# Patient Record
Sex: FEMALE | Race: BLACK OR AFRICAN AMERICAN | NOT HISPANIC OR LATINO | Employment: FULL TIME | URBAN - METROPOLITAN AREA
[De-identification: names, ages, dates, MRNs, and addresses within clinical notes are randomized per-mention and may not be internally consistent; named-entity substitution may affect disease eponyms.]

---

## 2018-07-06 ENCOUNTER — OFFICE VISIT (OUTPATIENT)
Dept: OBSTETRICS AND GYNECOLOGY | Facility: CLINIC | Age: 43
End: 2018-07-06
Payer: COMMERCIAL

## 2018-07-06 ENCOUNTER — TELEPHONE (OUTPATIENT)
Dept: INTERNAL MEDICINE | Facility: CLINIC | Age: 43
End: 2018-07-06

## 2018-07-06 VITALS
WEIGHT: 169 LBS | HEIGHT: 62 IN | DIASTOLIC BLOOD PRESSURE: 120 MMHG | BODY MASS INDEX: 31.1 KG/M2 | SYSTOLIC BLOOD PRESSURE: 180 MMHG

## 2018-07-06 DIAGNOSIS — I10 ESSENTIAL HYPERTENSION: ICD-10-CM

## 2018-07-06 DIAGNOSIS — Z01.419 WELL WOMAN EXAM WITH ROUTINE GYNECOLOGICAL EXAM: Primary | ICD-10-CM

## 2018-07-06 DIAGNOSIS — N89.8 VAGINAL DISCHARGE: ICD-10-CM

## 2018-07-06 LAB
CANDIDA RRNA VAG QL PROBE: NEGATIVE
G VAGINALIS RRNA GENITAL QL PROBE: POSITIVE
T VAGINALIS RRNA GENITAL QL PROBE: NEGATIVE

## 2018-07-06 PROCEDURE — 99999 PR PBB SHADOW E&M-EST. PATIENT-LVL III: CPT | Mod: PBBFAC,,, | Performed by: OBSTETRICS & GYNECOLOGY

## 2018-07-06 PROCEDURE — 99396 PREV VISIT EST AGE 40-64: CPT | Mod: S$GLB,,, | Performed by: OBSTETRICS & GYNECOLOGY

## 2018-07-06 PROCEDURE — 87660 TRICHOMONAS VAGIN DIR PROBE: CPT

## 2018-07-06 RX ORDER — AMLODIPINE BESYLATE 5 MG/1
5 TABLET ORAL DAILY
Qty: 30 TABLET | Refills: 11 | Status: SHIPPED | OUTPATIENT
Start: 2018-07-06 | End: 2018-08-09 | Stop reason: SDUPTHER

## 2018-07-06 NOTE — TELEPHONE ENCOUNTER
----- Message from Jessica WHALEN Gavin sent at 7/6/2018  4:57 PM CDT -----  Contact: PT Portal Request  Appointment Request From: Debbi Zhu    With Provider: Shamir Villegas MD [-Primary Care Physician-]    Would Accept With:Only the person I've selected    Preferred Date Range: Any date 7/6/2018 or later    Preferred Times: Any    Reason for visit: Request an Appt    Comments:  Saw OB/GYN and Pressure was High 180/120 medicine was prescribed but  a primary care DrTania is needed for annual check up and routine care

## 2018-07-06 NOTE — PROGRESS NOTES
History & Physical  Gynecology      SUBJECTIVE:     Chief Complaint: Well Woman       History of Present Illness:  Annual Exam-Premenopausal  Patient presents for annual exam. The patient has complaints today of vaginal discharge. Menstrual cycles are once a month, 8 days- heavy for the first 3-4 days (overnight pads).  She had a biopsy two years ago which was normal.  Periods have been this way for three years.  The patient is sexually active. GYN screening history: last pap: approximate date  and was normal. The patient participates in regular exercise: no.  The patient does not smoke.      Contraception: BTL    FH:  Breast cancer: none  Colon cancer: none  Ovarian cancer: none    Review of patient's allergies indicates:  No Known Allergies    Past Medical History:   Diagnosis Date    Abnormal Pap smear of cervix     laser ablation    Anemia     Fibroids     Hypertension      Past Surgical History:   Procedure Laterality Date     SECTION      6    DILATION AND CURETTAGE OF UTERUS      LASER ABLATION OF THE CERVIX      Secondary to abnormal pap    TUBAL LIGATION       OB History      Para Term  AB Living    8 6 5 1 2 6    SAB TAB Ectopic Multiple Live Births    2     0 6        Family History   Problem Relation Age of Onset    Hypertension Father     Breast cancer Neg Hx     Colon cancer Neg Hx     Ovarian cancer Neg Hx      Social History   Substance Use Topics    Smoking status: Never Smoker    Smokeless tobacco: Not on file    Alcohol use Yes      Comment: occ.       Current Outpatient Prescriptions   Medication Sig    amLODIPine (NORVASC) 5 MG tablet Take 1 tablet (5 mg total) by mouth once daily.     No current facility-administered medications for this visit.          Review of Systems:  Review of Systems   Constitutional: Negative for activity change, appetite change and fever.   Respiratory: Negative for shortness of breath.    Cardiovascular: Negative for  chest pain.   Gastrointestinal: Negative for abdominal pain, constipation, diarrhea, nausea and vomiting.   Genitourinary: Positive for vaginal discharge and vaginal odor. Negative for menorrhagia, menstrual problem, pelvic pain, vaginal bleeding and vaginal pain.   Neurological: Negative for numbness and headaches.   Breast: Negative for breast pain and nipple discharge       OBJECTIVE:     Physical Exam:  Physical Exam   Constitutional: She is oriented to person, place, and time. She appears well-developed and well-nourished.   Neck: Normal range of motion. Neck supple. No tracheal deviation present. No thyromegaly present.   Cardiovascular: Normal rate, regular rhythm and normal heart sounds.    Pulmonary/Chest: Effort normal and breath sounds normal. Right breast exhibits no inverted nipple, no mass, no nipple discharge, no skin change and no tenderness. Left breast exhibits no inverted nipple, no mass, no nipple discharge, no skin change and no tenderness. Breasts are symmetrical.   Abdominal: Soft.   Genitourinary: Vagina normal. No labial fusion. There is no rash, tenderness, lesion or injury on the right labia. There is no rash, tenderness, lesion or injury on the left labia. Uterus is enlarged (uterine size 16 wk). Uterus is not deviated, not fixed and not tender. Cervix exhibits no motion tenderness, no discharge and no friability. Right adnexum displays no mass, no tenderness and no fullness. Left adnexum displays no mass, no tenderness and no fullness. No erythema, tenderness or bleeding in the vagina. No foreign body in the vagina. No signs of injury around the vagina. No vaginal discharge found.   Genitourinary Comments: Urethra: normal appearing urethra with no masses, tenderness or lesions  Urethral meatus: normal size, anterior vaginal wall with no prolapse, no lesions   Neurological: She is alert and oriented to person, place, and time.   Psychiatric: She has a normal mood and affect. Her behavior  is normal. Judgment and thought content normal.   Nursing note and vitals reviewed.      Chaperoned by: [email or web address]    ASSESSMENT:       ICD-10-CM ICD-9-CM    1. Well woman exam with routine gynecological exam Z01.419 V72.31 Mammo Digital Screening Bilat with CAD   2. Vaginal discharge N89.8 623.5 Vaginosis Screen by DNA Probe   3. Essential hypertension I10 401.9 amLODIPine (NORVASC) 5 MG tablet          Plan:      Debbi was seen today for well woman.    Diagnoses and all orders for this visit:    Well woman exam with routine gynecological exam  -     Mammo Digital Screening Bilat with CAD; Future    Vaginal discharge  -     Vaginosis Screen by DNA Probe    Essential hypertension  -     amLODIPine (NORVASC) 5 MG tablet; Take 1 tablet (5 mg total) by mouth once daily.        Orders Placed This Encounter   Procedures    Vaginosis Screen by DNA Probe    Mammo Digital Screening Bilat with CAD       Well Woman:  - Pap smear not due  - Birth control: BTL  - GC/CT:n/a  - Mammogram: ordered  - Smoking cessation: n/a  - Labs: none required   - Vaccines: none required  - Exercise counseling    HTN:  - asymptomatic today; norvasc ordered and patient to start this, then follow up with PCP  - discussed risks of stroke, brain damage, death with BP this high    Menorrhagia:  - no change in menstrual cycle over the past few years, but still heavy; discussed options for treatment- medical with IUD, depo provera, progestins, vs hysterectomy; patient desires trial with mirena  - fibroid uterus palpable on exam; discussed that IUD has higher likelihood of expulsion and may not completely resolve bleeding  - she still desires to try an IUD    Follow up in one month for IUD placement    Emmanuelle Davies

## 2018-07-09 ENCOUNTER — PATIENT MESSAGE (OUTPATIENT)
Dept: OBSTETRICS AND GYNECOLOGY | Facility: CLINIC | Age: 43
End: 2018-07-09

## 2018-07-09 DIAGNOSIS — N76.0 BV (BACTERIAL VAGINOSIS): Primary | ICD-10-CM

## 2018-07-09 DIAGNOSIS — B96.89 BV (BACTERIAL VAGINOSIS): Primary | ICD-10-CM

## 2018-07-09 RX ORDER — METRONIDAZOLE 500 MG/1
500 TABLET ORAL EVERY 12 HOURS
Qty: 14 TABLET | Refills: 0 | Status: SHIPPED | OUTPATIENT
Start: 2018-07-09 | End: 2018-07-16

## 2018-07-16 ENCOUNTER — PATIENT MESSAGE (OUTPATIENT)
Dept: OBSTETRICS AND GYNECOLOGY | Facility: CLINIC | Age: 43
End: 2018-07-16

## 2018-07-17 ENCOUNTER — PATIENT MESSAGE (OUTPATIENT)
Dept: ADMINISTRATIVE | Facility: OTHER | Age: 43
End: 2018-07-17

## 2018-07-17 RX ORDER — FLUCONAZOLE 200 MG/1
200 TABLET ORAL
Qty: 2 TABLET | Refills: 0 | Status: SHIPPED | OUTPATIENT
Start: 2018-07-17 | End: 2018-07-22

## 2018-07-22 ENCOUNTER — HOSPITAL ENCOUNTER (EMERGENCY)
Facility: HOSPITAL | Age: 43
Discharge: HOME | End: 2018-07-22
Attending: EMERGENCY MEDICINE
Payer: COMMERCIAL

## 2018-07-22 VITALS
HEIGHT: 62 IN | DIASTOLIC BLOOD PRESSURE: 84 MMHG | SYSTOLIC BLOOD PRESSURE: 128 MMHG | HEART RATE: 98 BPM | TEMPERATURE: 98 F | BODY MASS INDEX: 31.1 KG/M2 | OXYGEN SATURATION: 99 % | RESPIRATION RATE: 20 BRPM | WEIGHT: 169 LBS

## 2018-07-22 DIAGNOSIS — J02.9 PHARYNGITIS, UNSPECIFIED ETIOLOGY: Primary | ICD-10-CM

## 2018-07-22 LAB — DEPRECATED S PYO AG THROAT QL EIA: NEGATIVE

## 2018-07-22 PROCEDURE — 87081 CULTURE SCREEN ONLY: CPT

## 2018-07-22 PROCEDURE — 99284 EMERGENCY DEPT VISIT MOD MDM: CPT | Mod: 25

## 2018-07-22 PROCEDURE — 87880 STREP A ASSAY W/OPTIC: CPT

## 2018-07-22 PROCEDURE — 25000003 PHARM REV CODE 250: Performed by: PHYSICIAN ASSISTANT

## 2018-07-22 PROCEDURE — 87147 CULTURE TYPE IMMUNOLOGIC: CPT

## 2018-07-22 PROCEDURE — 63600175 PHARM REV CODE 636 W HCPCS: Performed by: PHYSICIAN ASSISTANT

## 2018-07-22 PROCEDURE — 96372 THER/PROPH/DIAG INJ SC/IM: CPT

## 2018-07-22 RX ORDER — KETOROLAC TROMETHAMINE 10 MG/1
10 TABLET, FILM COATED ORAL
Status: COMPLETED | OUTPATIENT
Start: 2018-07-22 | End: 2018-07-22

## 2018-07-22 RX ORDER — IBUPROFEN 600 MG/1
600 TABLET ORAL EVERY 6 HOURS PRN
Qty: 20 TABLET | Refills: 0 | Status: ON HOLD | OUTPATIENT
Start: 2018-07-22 | End: 2019-10-15 | Stop reason: HOSPADM

## 2018-07-22 RX ORDER — DEXAMETHASONE SODIUM PHOSPHATE 4 MG/ML
8 INJECTION, SOLUTION INTRA-ARTICULAR; INTRALESIONAL; INTRAMUSCULAR; INTRAVENOUS; SOFT TISSUE
Status: COMPLETED | OUTPATIENT
Start: 2018-07-22 | End: 2018-07-22

## 2018-07-22 RX ORDER — CETIRIZINE HYDROCHLORIDE 10 MG/1
10 TABLET ORAL DAILY
Qty: 30 TABLET | Refills: 0 | Status: ON HOLD | OUTPATIENT
Start: 2018-07-22 | End: 2019-10-15 | Stop reason: HOSPADM

## 2018-07-22 RX ORDER — AZITHROMYCIN 250 MG/1
250 TABLET, FILM COATED ORAL DAILY
Qty: 6 TABLET | Refills: 0 | Status: SHIPPED | OUTPATIENT
Start: 2018-07-22 | End: 2018-08-01

## 2018-07-22 RX ADMIN — DEXAMETHASONE SODIUM PHOSPHATE 8 MG: 4 INJECTION, SOLUTION INTRAMUSCULAR; INTRAVENOUS at 08:07

## 2018-07-22 RX ADMIN — KETOROLAC TROMETHAMINE 10 MG: 10 TABLET, FILM COATED ORAL at 08:07

## 2018-07-22 NOTE — ED PROVIDER NOTES
Encounter Date: 2018       History     Chief Complaint   Patient presents with    Sore Throat     red swollen sore throat since thursday. difficulty swallowing     Debbi Zhu 43 y.o. afebrile female with history of anemia, fibroids, and HTN presented to the ED with c/o sore throat. She reported that symptoms began on Thursday and have continued since this time.  She does report they have gradually worsened since onset.  She states that she has pain that is exacerbated with swelling however denies any difficulty with swallowing.  She also reports some postnasal drip, bilateral tonsillar lymphadenopathy and nausea.  She reports that her youngest child had similar symptoms earlier this week and of the child now has a fine sandpaper-like rash. She denies any known exposure to strep or specific viral illness at this time.  She denies any headache, ear pain, difficulty swallowing, dental pain, cough, CP, SOB, vomiting, diarrhea or rash.      The history is provided by the patient.     Review of patient's allergies indicates:  No Known Allergies  Past Medical History:   Diagnosis Date    Abnormal Pap smear of cervix     laser ablation    Anemia     Fibroids     Hypertension      Past Surgical History:   Procedure Laterality Date     SECTION      6    DILATION AND CURETTAGE OF UTERUS      LASER ABLATION OF THE CERVIX      Secondary to abnormal pap    TUBAL LIGATION       Family History   Problem Relation Age of Onset    Hypertension Father     Breast cancer Neg Hx     Colon cancer Neg Hx     Ovarian cancer Neg Hx      Social History   Substance Use Topics    Smoking status: Never Smoker    Smokeless tobacco: Not on file    Alcohol use Yes      Comment: occ.     Review of Systems   Constitutional: Negative for fever (subjective).   HENT: Positive for postnasal drip and sore throat. Negative for trouble swallowing.    Respiratory: Negative for cough, shortness of breath and  wheezing.    Gastrointestinal: Positive for nausea. Negative for vomiting.   Genitourinary: Negative for dysuria.   Musculoskeletal: Negative for back pain and neck stiffness.   Skin: Negative for rash.   Neurological: Negative for weakness.   Hematological: Positive for adenopathy. Does not bruise/bleed easily.       Physical Exam     Initial Vitals [07/22/18 0800]   BP Pulse Resp Temp SpO2   128/84 98 20 98.2 °F (36.8 °C) 99 %      MAP       --         Physical Exam    Nursing note and vitals reviewed.  Constitutional: Vital signs are normal. She appears well-developed and well-nourished. She is cooperative.  Non-toxic appearance. She does not appear ill. No distress.   HENT:   Head: Normocephalic and atraumatic.   Nose: Mucosal edema present. Right sinus exhibits no maxillary sinus tenderness and no frontal sinus tenderness. Left sinus exhibits no maxillary sinus tenderness and no frontal sinus tenderness.   Oropharynx with erythema; bilateral tonsillar edema with exudate. No uvula deviation or tonsillar abscess   Eyes: Conjunctivae and lids are normal.   Neck: Neck supple. No neck rigidity.   Cardiovascular: Normal rate and regular rhythm.   Pulmonary/Chest: Breath sounds normal. No respiratory distress. She has no wheezes. She has no rhonchi.   Abdominal: Soft. Normal appearance and bowel sounds are normal. There is no tenderness. There is no rigidity and no guarding.   Musculoskeletal: Normal range of motion.   Lymphadenopathy:        Head (right side): Tonsillar adenopathy present.        Head (left side): Tonsillar adenopathy present.   Neurological: She is alert and oriented to person, place, and time. GCS eye subscore is 4. GCS verbal subscore is 5. GCS motor subscore is 6.   Skin: Skin is warm, dry and intact. No rash noted.   Psychiatric: She has a normal mood and affect. Her speech is normal and behavior is normal. Thought content normal.         ED Course   Procedures  Labs Reviewed   THROAT SCREEN,  RAPID   CULTURE, STREP A,  THROAT          Imaging Results    None         Debbi Zhu 43 y.o. afebrile female with history of anemia, fibroids, and HTN presented to the ED with c/o sore throat. She reported that symptoms began on Thursday and have continued since this time.  She does report they have gradually worsened since onset.  She states that she has pain that is exacerbated with swelling however denies any difficulty with swallowing.  She also reports some postnasal drip, bilateral tonsillar lymphadenopathy and nausea.  She reports that her youngest child had similar symptoms earlier this week and of the child now has a fine sandpaper-like rash. She denies any known exposure to strep or specific viral illness at this time.  She denies any headache, ear pain, difficulty swallowing, dental pain, cough, CP, SOB, vomiting, diarrhea or rash. ROS positive for URI symptoms. She has not tried any medications for the symptoms. Mother Physical exam reveals patient that appears ill but nontoxic. TM's clear; nose with edmea. Oropharynx with erythema; bilateral tonsillar edema with exudate. No uvula deviation or tonsillar abscess. Lungs clear and free of wheeze. Heart regular rate and rhythm. Abdomen is soft and nontender with normal bowel sounds. FROM of neck, no lymphadenopathy and FROM of all extremities with strength 5/5 bilaterally. Skin free of rash, pallor and diaphoresis.    DDX:  Viral pharyngitis, viral URI with cough, strep pharyngitis, tonsillar abscess    ED management: strep swab negative however as she has noted edema, erythema and exudates with along with similar symptoms home we will treat for possible group a strep pharyngitis. No further labs or imaging warranted at this time as patient remains afebrile and in no distress at this time.  She did report some improvement in pain with Toradol and Decadron in the ED as she reported no chance of pregnancy.  Instructed patient on fever and symptom  control.      Impression/Plan: The encounter diagnosis was Pharyngitis, unspecified etiology. Discharged with motrin, zyrtec and Zpak. Patient will follow up with Primary.  Patient cautioned on when to return to ED.  Pt. Understands and agrees with current treatment plan                                              Clinical Impression:   The encounter diagnosis was Pharyngitis, unspecified etiology.                             FIDEL Rivera  07/22/18 [id number]

## 2018-07-23 LAB — BACTERIA THROAT CULT: NORMAL

## 2018-07-30 ENCOUNTER — PATIENT MESSAGE (OUTPATIENT)
Dept: ADMINISTRATIVE | Facility: OTHER | Age: 43
End: 2018-07-30

## 2018-07-30 ENCOUNTER — PATIENT MESSAGE (OUTPATIENT)
Dept: OBSTETRICS AND GYNECOLOGY | Facility: CLINIC | Age: 43
End: 2018-07-30

## 2018-08-09 ENCOUNTER — OFFICE VISIT (OUTPATIENT)
Dept: INTERNAL MEDICINE | Facility: CLINIC | Age: 43
End: 2018-08-09
Attending: FAMILY MEDICINE
Payer: COMMERCIAL

## 2018-08-09 VITALS
SYSTOLIC BLOOD PRESSURE: 124 MMHG | OXYGEN SATURATION: 99 % | BODY MASS INDEX: 31.06 KG/M2 | HEART RATE: 115 BPM | DIASTOLIC BLOOD PRESSURE: 72 MMHG | HEIGHT: 62 IN | WEIGHT: 168.81 LBS

## 2018-08-09 DIAGNOSIS — R01.1 MURMUR, CARDIAC: ICD-10-CM

## 2018-08-09 DIAGNOSIS — Z12.31 ENCOUNTER FOR SCREENING MAMMOGRAM FOR MALIGNANT NEOPLASM OF BREAST: ICD-10-CM

## 2018-08-09 DIAGNOSIS — I10 HYPERTENSION, ESSENTIAL: ICD-10-CM

## 2018-08-09 DIAGNOSIS — Z00.00 ANNUAL PHYSICAL EXAM: Primary | ICD-10-CM

## 2018-08-09 PROCEDURE — 99386 PREV VISIT NEW AGE 40-64: CPT | Mod: S$GLB,,, | Performed by: FAMILY MEDICINE

## 2018-08-09 PROCEDURE — 99999 PR PBB SHADOW E&M-EST. PATIENT-LVL IV: CPT | Mod: PBBFAC,,, | Performed by: FAMILY MEDICINE

## 2018-08-09 RX ORDER — AMLODIPINE BESYLATE 5 MG/1
5 TABLET ORAL DAILY
Qty: 90 TABLET | Refills: 1 | Status: ON HOLD | OUTPATIENT
Start: 2018-08-09 | End: 2019-10-16 | Stop reason: SDUPTHER

## 2018-08-09 NOTE — PROGRESS NOTES
Subjective:       Patient ID: Debbi Zhu is a 43 y.o. female.    Chief Complaint: Annual Exam    New patient to establish care and annual wellness check, physical exam.  P, S, Fm, Soc Hx's; Meds, allergies reviewed and reconciled.  Health maintenance file reviewed and addressed items due.        Review of Systems   Constitutional: Negative for activity change, chills, fatigue, fever and unexpected weight change.   HENT: Negative for congestion, hearing loss, rhinorrhea and trouble swallowing.    Eyes: Negative for discharge, redness and visual disturbance.   Respiratory: Negative for cough, chest tightness, shortness of breath and wheezing.    Cardiovascular: Negative for chest pain, palpitations and leg swelling.   Gastrointestinal: Negative for abdominal pain, blood in stool, constipation, diarrhea and vomiting.   Endocrine: Negative for polydipsia and polyuria.   Genitourinary: Negative for difficulty urinating, hematuria and menstrual problem.   Musculoskeletal: Negative for arthralgias, back pain, gait problem, joint swelling, myalgias and neck pain.   Skin: Negative for color change and rash.   Neurological: Negative for tremors, speech difficulty, weakness, numbness and headaches.   Hematological: Negative for adenopathy. Does not bruise/bleed easily.   Psychiatric/Behavioral: Negative for behavioral problems, confusion, dysphoric mood and sleep disturbance. The patient is not nervous/anxious.        Objective:      Physical Exam   Constitutional: She is oriented to person, place, and time. She appears well-developed and well-nourished.   HENT:   Head: Normocephalic.   Right Ear: Tympanic membrane, external ear and ear canal normal.   Left Ear: Tympanic membrane, external ear and ear canal normal.   Mouth/Throat: Oropharynx is clear and moist.   Eyes: Pupils are equal, round, and reactive to light. No scleral icterus.   Neck: Normal range of motion. Neck supple. Carotid bruit is not present. No  thyromegaly present.   Cardiovascular: Normal rate, regular rhythm and intact distal pulses.  Exam reveals no gallop and no friction rub.    Murmur heard.  Pulmonary/Chest: Effort normal and breath sounds normal. She exhibits no tenderness.   Abdominal: Soft. Bowel sounds are normal. She exhibits no distension and no mass. There is no tenderness. There is no rebound and no guarding.   Musculoskeletal: Normal range of motion. She exhibits no edema or tenderness.   Lymphadenopathy:     She has no cervical adenopathy.   Neurological: She is alert and oriented to person, place, and time. She has normal strength. She displays normal reflexes. No cranial nerve deficit or sensory deficit. Coordination and gait normal.   Skin: Skin is warm and dry. No rash noted.   Psychiatric: She has a normal mood and affect. Her behavior is normal. Judgment and thought content normal.   Nursing note and vitals reviewed.      Assessment:       1. Annual physical exam    2. Encounter for screening mammogram for malignant neoplasm of breast    3. Hypertension, essential    4. Murmur, cardiac        Plan:   Debbi was seen today for annual exam.    Diagnoses and all orders for this visit:    Annual physical exam  -     CBC Without Differential; Future  -     Basic metabolic panel; Future  -     Lipid panel; Future  -     TSH; Future  -     Iron; Future  -     Ferritin; Future    Encounter for screening mammogram for malignant neoplasm of breast  -     Mammo Digital Screening Bilat with CAD; Future    Hypertension, essential  -     Hypertension Digital Medicine (HDMP) Enrollment Order    Murmur, cardiac  -     EKG 12-lead; Future  -     2D echo with color flow doppler; Future    Other orders  -     amLODIPine (NORVASC) 5 MG tablet; Take 1 tablet (5 mg total) by mouth once daily.      See meds, orders, follow up, routing and instructions sections of encounter.  HISTORY OF PRESENT ILLNESS:  A 43-year-old patient to establish care.  She is    here for hypertension followup.  She has a history of gestational diabetes and   anemia.  She thinks she was placed on amlodipine recently.  She thinks it lasts   less than a 24-hour period of time.  She is experiencing no polyuria, polydipsia   or significant fatigue at this time.    To establish care, she orders and we will follow her up presumably in a year,   but sooner if her blood pressure is uncontrolled.  She wishes to enter the   Digital Hypertension Management program, which I think is a reasonable course of   action.      REG/IN  dd: 08/09/2018 17:23:17 (CDT)  td: 08/09/2018 21:16:26 (CDT)  Doc ID   #[id number]  Job ID #[id number]    CC:

## 2018-11-06 ENCOUNTER — PATIENT MESSAGE (OUTPATIENT)
Dept: ADMINISTRATIVE | Facility: OTHER | Age: 43
End: 2018-11-06

## 2019-04-01 ENCOUNTER — OFFICE VISIT (OUTPATIENT)
Dept: OBSTETRICS AND GYNECOLOGY | Facility: CLINIC | Age: 44
End: 2019-04-01
Payer: COMMERCIAL

## 2019-04-01 ENCOUNTER — PATIENT MESSAGE (OUTPATIENT)
Dept: ADMINISTRATIVE | Facility: OTHER | Age: 44
End: 2019-04-01

## 2019-04-01 DIAGNOSIS — Z30.9 ENCOUNTER FOR CONTRACEPTIVE MANAGEMENT, UNSPECIFIED TYPE: Primary | ICD-10-CM

## 2019-04-01 DIAGNOSIS — N89.8 VAGINAL DISCHARGE: ICD-10-CM

## 2019-04-01 DIAGNOSIS — N92.6 IRREGULAR MENSTRUAL CYCLE: Primary | ICD-10-CM

## 2019-04-01 LAB
B-HCG UR QL: NEGATIVE
BACTERIAL VAGINOSIS DNA: POSITIVE
CANDIDA GLABRATA DNA: NEGATIVE
CANDIDA KRUSEI DNA: NEGATIVE
CANDIDA RRNA VAG QL PROBE: POSITIVE
CTP QC/QA: YES
T VAGINALIS RRNA GENITAL QL PROBE: NEGATIVE

## 2019-04-01 PROCEDURE — 99213 PR OFFICE/OUTPT VISIT, EST, LEVL III, 20-29 MIN: ICD-10-PCS | Mod: S$GLB,,, | Performed by: OBSTETRICS & GYNECOLOGY

## 2019-04-01 PROCEDURE — 81025 POCT URINE PREGNANCY: ICD-10-PCS | Mod: S$GLB,,, | Performed by: OBSTETRICS & GYNECOLOGY

## 2019-04-01 PROCEDURE — 99999 PR PBB SHADOW E&M-EST. PATIENT-LVL I: CPT | Mod: PBBFAC,,, | Performed by: OBSTETRICS & GYNECOLOGY

## 2019-04-01 PROCEDURE — 87480 CANDIDA DNA DIR PROBE: CPT

## 2019-04-01 PROCEDURE — 87510 GARDNER VAG DNA DIR PROBE: CPT

## 2019-04-01 PROCEDURE — 99999 PR PBB SHADOW E&M-EST. PATIENT-LVL I: ICD-10-PCS | Mod: PBBFAC,,, | Performed by: OBSTETRICS & GYNECOLOGY

## 2019-04-01 PROCEDURE — 81025 URINE PREGNANCY TEST: CPT | Mod: S$GLB,,, | Performed by: OBSTETRICS & GYNECOLOGY

## 2019-04-01 PROCEDURE — 99213 OFFICE O/P EST LOW 20 MIN: CPT | Mod: S$GLB,,, | Performed by: OBSTETRICS & GYNECOLOGY

## 2019-04-01 NOTE — PROGRESS NOTES
Gynecology    SUBJECTIVE:     Chief Complaint: Vaginal Discharge       History of Present Illness:  44 year old who presents with two complaints.  First, she was two weeks late for her menstrual period.  This has never happened before.  Her tubes are tied.  She denies hot flashes, night sweats, or menopausal symptoms.  Period was normal when it occurred.  Pregnancy test is negative today.     Second, she has vaginal discharge.  Does get BV.  Discharge has a foul odor, is yellow and irritating, not thick/itchy.  Was taking a probiotic recommended by digital medicine which was working, but is not helping.    Review of Systems:  Review of Systems   Constitutional: Negative for appetite change, fever and unexpected weight change.   Respiratory: Negative for shortness of breath.    Cardiovascular: Negative for chest pain.   Gastrointestinal: Negative for nausea and vomiting.   Genitourinary: Positive for vaginal discharge and vaginal odor. Negative for menorrhagia, menstrual problem, pelvic pain, vaginal bleeding and vaginal pain.        OBJECTIVE:     Physical Exam:  Physical Exam   Constitutional: She is oriented to person, place, and time. She appears well-developed and well-nourished.   Pulmonary/Chest: Effort normal.   Abdominal: Soft.   Genitourinary: Uterus normal. No labial fusion. There is no rash, tenderness, lesion or injury on the right labia. There is no rash, tenderness, lesion or injury on the left labia. Uterus is not deviated, not enlarged, not fixed and not tender. Cervix exhibits no motion tenderness, no discharge and no friability. Right adnexum displays no mass, no tenderness and no fullness. Left adnexum displays no mass, no tenderness and no fullness. No erythema, tenderness or bleeding in the vagina. No foreign body in the vagina. No signs of injury around the vagina. Vaginal discharge found.   Genitourinary Comments: Urethra: normal appearing urethra with no masses, tenderness or  lesions  Urethral meatus: normal size, anterior vaginal wall with no prolapse, no lesions   Neurological: She is alert and oriented to person, place, and time.   Psychiatric: She has a normal mood and affect. Her behavior is normal. Judgment and thought content normal.   Nursing note and vitals reviewed.      Chaperoned by: Kirit    ASSESSMENT:       ICD-10-CM ICD-9-CM    1. Irregular menstrual cycle N92.6 626.4 POCT urine pregnancy   2. Vaginal discharge N89.8 623.5 Vaginosis Screen by DNA Probe          Plan:      Debbi was seen today for vaginal discharge.    Diagnoses and all orders for this visit:    Irregular menstrual cycle  -     POCT urine pregnancy    Vaginal discharge  -     Vaginosis Screen by DNA Probe    - vag screen ordered  - IUD order placed (patient never got IUD after last appointment, still has heavy, monthly cycles)    Orders Placed This Encounter   Procedures    Vaginosis Screen by DNA Probe    POCT urine pregnancy       Follow up for IUD placement.    Emmanuelle Davies

## 2019-04-02 ENCOUNTER — PATIENT MESSAGE (OUTPATIENT)
Dept: OBSTETRICS AND GYNECOLOGY | Facility: CLINIC | Age: 44
End: 2019-04-02

## 2019-04-02 DIAGNOSIS — B37.9 YEAST INFECTION: Primary | ICD-10-CM

## 2019-04-02 DIAGNOSIS — N76.0 BV (BACTERIAL VAGINOSIS): ICD-10-CM

## 2019-04-02 DIAGNOSIS — B96.89 BV (BACTERIAL VAGINOSIS): ICD-10-CM

## 2019-04-02 RX ORDER — METRONIDAZOLE 500 MG/1
500 TABLET ORAL EVERY 12 HOURS
Qty: 14 TABLET | Refills: 0 | Status: SHIPPED | OUTPATIENT
Start: 2019-04-02 | End: 2019-04-09

## 2019-04-02 RX ORDER — FLUCONAZOLE 150 MG/1
150 TABLET ORAL ONCE
Qty: 1 TABLET | Refills: 0 | Status: SHIPPED | OUTPATIENT
Start: 2019-04-02 | End: 2019-04-02

## 2019-04-08 ENCOUNTER — PATIENT MESSAGE (OUTPATIENT)
Dept: OBSTETRICS AND GYNECOLOGY | Facility: CLINIC | Age: 44
End: 2019-04-08

## 2019-04-15 ENCOUNTER — PATIENT MESSAGE (OUTPATIENT)
Dept: OBSTETRICS AND GYNECOLOGY | Facility: CLINIC | Age: 44
End: 2019-04-15

## 2019-04-15 DIAGNOSIS — B96.89 BV (BACTERIAL VAGINOSIS): Primary | ICD-10-CM

## 2019-04-15 DIAGNOSIS — N76.0 BV (BACTERIAL VAGINOSIS): Primary | ICD-10-CM

## 2019-04-17 RX ORDER — METRONIDAZOLE 500 MG/1
500 TABLET ORAL EVERY 12 HOURS
Qty: 14 TABLET | Refills: 0 | Status: SHIPPED | OUTPATIENT
Start: 2019-04-17 | End: 2019-04-24

## 2019-04-24 ENCOUNTER — PATIENT MESSAGE (OUTPATIENT)
Dept: OBSTETRICS AND GYNECOLOGY | Facility: CLINIC | Age: 44
End: 2019-04-24

## 2019-04-24 ENCOUNTER — TELEPHONE (OUTPATIENT)
Dept: PHARMACY | Facility: CLINIC | Age: 44
End: 2019-04-24

## 2019-04-24 DIAGNOSIS — B37.9 YEAST INFECTION: Primary | ICD-10-CM

## 2019-04-24 RX ORDER — FLUCONAZOLE 150 MG/1
150 TABLET ORAL ONCE
Qty: 1 TABLET | Refills: 0 | Status: SHIPPED | OUTPATIENT
Start: 2019-04-24 | End: 2019-04-24

## 2019-10-15 ENCOUNTER — PATIENT MESSAGE (OUTPATIENT)
Dept: ADMINISTRATIVE | Facility: OTHER | Age: 44
End: 2019-10-15

## 2019-10-15 ENCOUNTER — HOSPITAL ENCOUNTER (OUTPATIENT)
Facility: HOSPITAL | Age: 44
Discharge: HOME | End: 2019-10-16
Attending: EMERGENCY MEDICINE | Admitting: HOSPITALIST
Payer: COMMERCIAL

## 2019-10-15 ENCOUNTER — PATIENT MESSAGE (OUTPATIENT)
Dept: OBSTETRICS AND GYNECOLOGY | Facility: CLINIC | Age: 44
End: 2019-10-15

## 2019-10-15 DIAGNOSIS — D64.9 SYMPTOMATIC ANEMIA: Primary | ICD-10-CM

## 2019-10-15 DIAGNOSIS — I16.0 HYPERTENSIVE URGENCY: ICD-10-CM

## 2019-10-15 DIAGNOSIS — R00.2 PALPITATIONS: ICD-10-CM

## 2019-10-15 DIAGNOSIS — I10 HYPERTENSION, ESSENTIAL: ICD-10-CM

## 2019-10-15 DIAGNOSIS — E87.6 HYPOKALEMIA: ICD-10-CM

## 2019-10-15 LAB
ABO + RH BLD: NORMAL
ALBUMIN SERPL BCP-MCNC: 3.8 G/DL (ref 3.5–5.2)
ALP SERPL-CCNC: 126 U/L (ref 55–135)
ALT SERPL W/O P-5'-P-CCNC: 10 U/L (ref 10–44)
ANION GAP SERPL CALC-SCNC: 10 MMOL/L (ref 8–16)
ANISOCYTOSIS BLD QL SMEAR: SLIGHT
ANISOCYTOSIS BLD QL SMEAR: SLIGHT
APTT BLDCRRT: 29.4 SEC (ref 21–32)
AST SERPL-CCNC: 17 U/L (ref 10–40)
B-HCG UR QL: NEGATIVE
BACTERIA #/AREA URNS HPF: NORMAL /HPF
BASOPHILS # BLD AUTO: 0.02 K/UL (ref 0–0.2)
BASOPHILS # BLD AUTO: 0.03 K/UL (ref 0–0.2)
BASOPHILS NFR BLD: 0.3 % (ref 0–1.9)
BASOPHILS NFR BLD: 0.5 % (ref 0–1.9)
BILIRUB SERPL-MCNC: 0.5 MG/DL (ref 0.1–1)
BILIRUB UR QL STRIP: NEGATIVE
BLD GP AB SCN CELLS X3 SERPL QL: NORMAL
BLD PROD TYP BPU: NORMAL
BLOOD UNIT EXPIRATION DATE: NORMAL
BLOOD UNIT TYPE CODE: 5100
BLOOD UNIT TYPE: NORMAL
BNP SERPL-MCNC: 32 PG/ML (ref 0–99)
BUN SERPL-MCNC: 9 MG/DL (ref 6–20)
CALCIUM SERPL-MCNC: 9.3 MG/DL (ref 8.7–10.5)
CHLORIDE SERPL-SCNC: 102 MMOL/L (ref 95–110)
CLARITY UR: CLEAR
CO2 SERPL-SCNC: 27 MMOL/L (ref 23–29)
CODING SYSTEM: NORMAL
COLOR UR: YELLOW
CREAT SERPL-MCNC: 0.8 MG/DL (ref 0.5–1.4)
CTP QC/QA: YES
DACRYOCYTES BLD QL SMEAR: ABNORMAL
DIFFERENTIAL METHOD: ABNORMAL
DIFFERENTIAL METHOD: ABNORMAL
DISPENSE STATUS: NORMAL
EOSINOPHIL # BLD AUTO: 0 K/UL (ref 0–0.5)
EOSINOPHIL # BLD AUTO: 0 K/UL (ref 0–0.5)
EOSINOPHIL NFR BLD: 0.4 % (ref 0–8)
EOSINOPHIL NFR BLD: 0.5 % (ref 0–8)
ERYTHROCYTE [DISTWIDTH] IN BLOOD BY AUTOMATED COUNT: 21.2 % (ref 11.5–14.5)
ERYTHROCYTE [DISTWIDTH] IN BLOOD BY AUTOMATED COUNT: 24.9 % (ref 11.5–14.5)
EST. GFR  (AFRICAN AMERICAN): >60 ML/MIN/1.73 M^2
EST. GFR  (NON AFRICAN AMERICAN): >60 ML/MIN/1.73 M^2
FERRITIN SERPL-MCNC: <1 NG/ML (ref 20–300)
GLUCOSE SERPL-MCNC: 92 MG/DL (ref 70–110)
GLUCOSE UR QL STRIP: NEGATIVE
HCT VFR BLD AUTO: 26.2 % (ref 37–48.5)
HCT VFR BLD AUTO: 29.6 % (ref 37–48.5)
HGB BLD-MCNC: 6.6 G/DL (ref 12–16)
HGB BLD-MCNC: 7.9 G/DL (ref 12–16)
HGB UR QL STRIP: ABNORMAL
HYPOCHROMIA BLD QL SMEAR: ABNORMAL
HYPOCHROMIA BLD QL SMEAR: ABNORMAL
INR PPP: 1 (ref 0.8–1.2)
IRON SERPL-MCNC: 14 UG/DL (ref 30–160)
KETONES UR QL STRIP: NEGATIVE
LEUKOCYTE ESTERASE UR QL STRIP: NEGATIVE
LYMPHOCYTES # BLD AUTO: 1.1 K/UL (ref 1–4.8)
LYMPHOCYTES # BLD AUTO: 1.6 K/UL (ref 1–4.8)
LYMPHOCYTES NFR BLD: 15.8 % (ref 18–48)
LYMPHOCYTES NFR BLD: 27.7 % (ref 18–48)
MCH RBC QN AUTO: 14.9 PG (ref 27–31)
MCH RBC QN AUTO: 16.7 PG (ref 27–31)
MCHC RBC AUTO-ENTMCNC: 25.2 G/DL (ref 32–36)
MCHC RBC AUTO-ENTMCNC: 26.7 G/DL (ref 32–36)
MCV RBC AUTO: 59 FL (ref 82–98)
MCV RBC AUTO: 63 FL (ref 82–98)
MICROSCOPIC COMMENT: NORMAL
MONOCYTES # BLD AUTO: 0.1 K/UL (ref 0.3–1)
MONOCYTES # BLD AUTO: 0.3 K/UL (ref 0.3–1)
MONOCYTES NFR BLD: 1.8 % (ref 4–15)
MONOCYTES NFR BLD: 4.4 % (ref 4–15)
NEUTROPHILS # BLD AUTO: 3.8 K/UL (ref 1.8–7.7)
NEUTROPHILS # BLD AUTO: 5.9 K/UL (ref 1.8–7.7)
NEUTROPHILS NFR BLD: 66.9 % (ref 38–73)
NEUTROPHILS NFR BLD: 81.7 % (ref 38–73)
NITRITE UR QL STRIP: NEGATIVE
OB PNL STL: NEGATIVE
OVALOCYTES BLD QL SMEAR: ABNORMAL
PH UR STRIP: 7 [PH] (ref 5–8)
PLATELET # BLD AUTO: 526 K/UL (ref 150–350)
PLATELET # BLD AUTO: 626 K/UL (ref 150–350)
PLATELET BLD QL SMEAR: ABNORMAL
PLATELET BLD QL SMEAR: ABNORMAL
PMV BLD AUTO: 9.4 FL (ref 9.2–12.9)
PMV BLD AUTO: 9.4 FL (ref 9.2–12.9)
POIKILOCYTOSIS BLD QL SMEAR: SLIGHT
POIKILOCYTOSIS BLD QL SMEAR: SLIGHT
POTASSIUM SERPL-SCNC: 3.2 MMOL/L (ref 3.5–5.1)
PROT SERPL-MCNC: 8.2 G/DL (ref 6–8.4)
PROT UR QL STRIP: ABNORMAL
PROTHROMBIN TIME: 10.7 SEC (ref 9–12.5)
RBC # BLD AUTO: 4.42 M/UL (ref 4–5.4)
RBC # BLD AUTO: 4.73 M/UL (ref 4–5.4)
RBC #/AREA URNS HPF: 1 /HPF (ref 0–4)
SATURATED IRON: 3 % (ref 20–50)
SODIUM SERPL-SCNC: 139 MMOL/L (ref 136–145)
SP GR UR STRIP: 1.02 (ref 1–1.03)
SQUAMOUS #/AREA URNS HPF: 6 /HPF
TARGETS BLD QL SMEAR: ABNORMAL
TOTAL IRON BINDING CAPACITY: 554 UG/DL (ref 250–450)
TRANS ERYTHROCYTES VOL PATIENT: NORMAL ML
TRANSFERRIN SERPL-MCNC: 374 MG/DL (ref 200–375)
TROPONIN I SERPL DL<=0.01 NG/ML-MCNC: <0.006 NG/ML (ref 0–0.03)
TSH SERPL DL<=0.005 MIU/L-ACNC: 1.56 UIU/ML (ref 0.4–4)
URN SPEC COLLECT METH UR: ABNORMAL
UROBILINOGEN UR STRIP-ACNC: 1 EU/DL
WBC # BLD AUTO: 5.66 K/UL (ref 3.9–12.7)
WBC # BLD AUTO: 7.21 K/UL (ref 3.9–12.7)
WBC #/AREA URNS HPF: 2 /HPF (ref 0–5)

## 2019-10-15 PROCEDURE — 99220 PR INITIAL OBSERVATION CARE,LEVL III: CPT | Mod: ,,, | Performed by: PHYSICIAN ASSISTANT

## 2019-10-15 PROCEDURE — 82272 OCCULT BLD FECES 1-3 TESTS: CPT

## 2019-10-15 PROCEDURE — 93010 EKG 12-LEAD: ICD-10-PCS | Mod: ,,, | Performed by: INTERNAL MEDICINE

## 2019-10-15 PROCEDURE — 81000 URINALYSIS NONAUTO W/SCOPE: CPT

## 2019-10-15 PROCEDURE — 82728 ASSAY OF FERRITIN: CPT

## 2019-10-15 PROCEDURE — 94761 N-INVAS EAR/PLS OXIMETRY MLT: CPT

## 2019-10-15 PROCEDURE — 86920 COMPATIBILITY TEST SPIN: CPT

## 2019-10-15 PROCEDURE — 25000003 PHARM REV CODE 250: Performed by: PHYSICIAN ASSISTANT

## 2019-10-15 PROCEDURE — 85025 COMPLETE CBC W/AUTO DIFF WBC: CPT | Mod: 91

## 2019-10-15 PROCEDURE — 36430 TRANSFUSION BLD/BLD COMPNT: CPT

## 2019-10-15 PROCEDURE — 84443 ASSAY THYROID STIM HORMONE: CPT

## 2019-10-15 PROCEDURE — 85610 PROTHROMBIN TIME: CPT

## 2019-10-15 PROCEDURE — 86901 BLOOD TYPING SEROLOGIC RH(D): CPT

## 2019-10-15 PROCEDURE — 63600175 PHARM REV CODE 636 W HCPCS: Performed by: PHYSICIAN ASSISTANT

## 2019-10-15 PROCEDURE — 93005 ELECTROCARDIOGRAM TRACING: CPT

## 2019-10-15 PROCEDURE — 84484 ASSAY OF TROPONIN QUANT: CPT

## 2019-10-15 PROCEDURE — 81025 URINE PREGNANCY TEST: CPT | Performed by: NURSE PRACTITIONER

## 2019-10-15 PROCEDURE — 99220 PR INITIAL OBSERVATION CARE,LEVL III: ICD-10-PCS | Mod: ,,, | Performed by: PHYSICIAN ASSISTANT

## 2019-10-15 PROCEDURE — 36415 COLL VENOUS BLD VENIPUNCTURE: CPT

## 2019-10-15 PROCEDURE — P9021 RED BLOOD CELLS UNIT: HCPCS

## 2019-10-15 PROCEDURE — 96375 TX/PRO/DX INJ NEW DRUG ADDON: CPT

## 2019-10-15 PROCEDURE — 96365 THER/PROPH/DIAG IV INF INIT: CPT

## 2019-10-15 PROCEDURE — 25000003 PHARM REV CODE 250: Performed by: NURSE PRACTITIONER

## 2019-10-15 PROCEDURE — 63600175 PHARM REV CODE 636 W HCPCS: Performed by: NURSE PRACTITIONER

## 2019-10-15 PROCEDURE — G0378 HOSPITAL OBSERVATION PER HR: HCPCS

## 2019-10-15 PROCEDURE — 83540 ASSAY OF IRON: CPT

## 2019-10-15 PROCEDURE — 85730 THROMBOPLASTIN TIME PARTIAL: CPT

## 2019-10-15 PROCEDURE — 99285 EMERGENCY DEPT VISIT HI MDM: CPT | Mod: 25

## 2019-10-15 PROCEDURE — 93010 ELECTROCARDIOGRAM REPORT: CPT | Mod: ,,, | Performed by: INTERNAL MEDICINE

## 2019-10-15 PROCEDURE — 83880 ASSAY OF NATRIURETIC PEPTIDE: CPT

## 2019-10-15 PROCEDURE — 80053 COMPREHEN METABOLIC PANEL: CPT

## 2019-10-15 RX ORDER — ASPIRIN 325 MG
325 TABLET ORAL
Status: COMPLETED | OUTPATIENT
Start: 2019-10-15 | End: 2019-10-15

## 2019-10-15 RX ORDER — ACETAMINOPHEN 325 MG/1
650 TABLET ORAL EVERY 4 HOURS PRN
Status: DISCONTINUED | OUTPATIENT
Start: 2019-10-15 | End: 2019-10-16 | Stop reason: HOSPADM

## 2019-10-15 RX ORDER — RAMELTEON 8 MG/1
8 TABLET ORAL NIGHTLY PRN
Status: DISCONTINUED | OUTPATIENT
Start: 2019-10-15 | End: 2019-10-16 | Stop reason: HOSPADM

## 2019-10-15 RX ORDER — SUMATRIPTAN SUCCINATE 25 MG/1
25 TABLET ORAL ONCE
Status: COMPLETED | OUTPATIENT
Start: 2019-10-15 | End: 2019-10-15

## 2019-10-15 RX ORDER — HYDRALAZINE HYDROCHLORIDE 20 MG/ML
10 INJECTION INTRAMUSCULAR; INTRAVENOUS
Status: COMPLETED | OUTPATIENT
Start: 2019-10-15 | End: 2019-10-15

## 2019-10-15 RX ORDER — IPRATROPIUM BROMIDE AND ALBUTEROL SULFATE 2.5; .5 MG/3ML; MG/3ML
3 SOLUTION RESPIRATORY (INHALATION) EVERY 4 HOURS PRN
Status: DISCONTINUED | OUTPATIENT
Start: 2019-10-15 | End: 2019-10-16 | Stop reason: HOSPADM

## 2019-10-15 RX ORDER — AMLODIPINE BESYLATE 5 MG/1
5 TABLET ORAL DAILY
Status: DISCONTINUED | OUTPATIENT
Start: 2019-10-16 | End: 2019-10-16 | Stop reason: HOSPADM

## 2019-10-15 RX ORDER — HYDROCODONE BITARTRATE AND ACETAMINOPHEN 500; 5 MG/1; MG/1
TABLET ORAL
Status: DISCONTINUED | OUTPATIENT
Start: 2019-10-15 | End: 2019-10-16 | Stop reason: HOSPADM

## 2019-10-15 RX ORDER — ONDANSETRON 8 MG/1
8 TABLET, ORALLY DISINTEGRATING ORAL EVERY 8 HOURS PRN
Status: DISCONTINUED | OUTPATIENT
Start: 2019-10-15 | End: 2019-10-16 | Stop reason: HOSPADM

## 2019-10-15 RX ORDER — POLYETHYLENE GLYCOL 3350 17 G/17G
POWDER, FOR SOLUTION ORAL
Status: DISPENSED
Start: 2019-10-15 | End: 2019-10-16

## 2019-10-15 RX ORDER — POLYETHYLENE GLYCOL 3350 17 G/17G
17 POWDER, FOR SOLUTION ORAL DAILY
Status: DISCONTINUED | OUTPATIENT
Start: 2019-10-15 | End: 2019-10-15

## 2019-10-15 RX ORDER — POTASSIUM CHLORIDE 20 MEQ/15ML
40 SOLUTION ORAL
Status: DISCONTINUED | OUTPATIENT
Start: 2019-10-15 | End: 2019-10-16 | Stop reason: HOSPADM

## 2019-10-15 RX ORDER — BUTALBITAL, ACETAMINOPHEN AND CAFFEINE 50; 325; 40 MG/1; MG/1; MG/1
1 TABLET ORAL EVERY 4 HOURS PRN
Status: DISCONTINUED | OUTPATIENT
Start: 2019-10-15 | End: 2019-10-16 | Stop reason: HOSPADM

## 2019-10-15 RX ORDER — POTASSIUM CHLORIDE 20 MEQ/15ML
40 SOLUTION ORAL
Status: COMPLETED | OUTPATIENT
Start: 2019-10-15 | End: 2019-10-15

## 2019-10-15 RX ORDER — FERROUS SULFATE 325(65) MG
325 TABLET ORAL
Qty: 90 TABLET | Refills: 0 | COMMUNITY
Start: 2019-10-15 | End: 2020-01-17 | Stop reason: SDUPTHER

## 2019-10-15 RX ORDER — BISACODYL 10 MG
10 SUPPOSITORY, RECTAL RECTAL DAILY PRN
Status: DISCONTINUED | OUTPATIENT
Start: 2019-10-15 | End: 2019-10-16 | Stop reason: HOSPADM

## 2019-10-15 RX ADMIN — BUTALBITAL, ACETAMINOPHEN, AND CAFFEINE 1 TABLET: 50; 325; 40 TABLET ORAL at 03:10

## 2019-10-15 RX ADMIN — HYDRALAZINE HYDROCHLORIDE 10 MG: 20 INJECTION INTRAMUSCULAR; INTRAVENOUS at 10:10

## 2019-10-15 RX ADMIN — IRON SUCROSE 100 MG: 20 INJECTION, SOLUTION INTRAVENOUS at 05:10

## 2019-10-15 RX ADMIN — ASPIRIN 325 MG ORAL TABLET 325 MG: 325 PILL ORAL at 10:10

## 2019-10-15 RX ADMIN — ACETAMINOPHEN 650 MG: 325 TABLET ORAL at 02:10

## 2019-10-15 RX ADMIN — RAMELTEON 8 MG: 8 TABLET, FILM COATED ORAL at 08:10

## 2019-10-15 RX ADMIN — POTASSIUM CHLORIDE 40 MEQ: 20 SOLUTION ORAL at 11:10

## 2019-10-15 RX ADMIN — SUMATRIPTAN SUCCINATE 25 MG: 25 TABLET ORAL at 06:10

## 2019-10-15 NOTE — H&P
[facility] Medicine  History & Physical    Patient Name: Debbi Zhu  MRN: [id number]  Admission Date: 10/15/2019  Attending Physician: Curt Hernandez DO   Primary Care Provider: Shamir Villegas MD         Patient information was obtained from patient, past medical records and ER records.     Subjective:     Principal Problem:Symptomatic anemia    Chief Complaint:   Chief Complaint   Patient presents with    Palpitations     Pt reports that she has been having Hypertension and heart palpitations that started this weekend. Pt states she is out of her blood pressure medication. Pt reports that she gets slurred speech when her heart races. EKG done in triage.         HPI: Patient is a 45yo AAF with a PMHx of HTN being admitted to observation for symptomatic anemia. Patient reports chronic weakness due to heavy menstrual cycles since the birth of her last child 4 years ago. However, the last few weeks she reports increased weakness and lethargy to the point of not being able to get out of bed. On Saturday, she noticed blurred vision and nausea with associated palpitations and shortness of breath. Her last period was two weeks ago for which she continues to have mild spotting. She reports a history of fibroids and is not on contraception. She says she was being worked up for possible IUD v hysterectomy. She has had blood transfusions in the past for her anemia. She denies fevers, chills, abdominal pain, and bloody stools. She denies usage of NSAIDs.    In the ED, , /102. Intake labs remarkable for Hgb 6.6. CXR clear. She was type & screened, consented, and given 1 unit RBCs.    Past Medical History:   Diagnosis Date    Abnormal Pap smear of cervix     laser ablation    Anemia     Fibroids     Hypertension        Past Surgical History:   Procedure Laterality Date     SECTION      6    DILATION AND CURETTAGE OF UTERUS      LASER ABLATION OF THE CERVIX   1993    Secondary to abnormal pap    TUBAL LIGATION         Review of patient's allergies indicates:  No Known Allergies    No current facility-administered medications on file prior to encounter.      Current Outpatient Medications on File Prior to Encounter   Medication Sig    amLODIPine (NORVASC) 5 MG tablet Take 1 tablet (5 mg total) by mouth once daily.    cetirizine (ZYRTEC) 10 MG tablet Take 1 tablet (10 mg total) by mouth once daily.    ibuprofen (ADVIL,MOTRIN) 600 MG tablet Take 1 tablet (600 mg total) by mouth every 6 (six) hours as needed.    levonorgestrel (MIRENA) 20 mcg/24 hr (5 years) IUD 1 Intra Uterine Device by Intrauterine route once. for 1 dose     Family History     Problem Relation (Age of Onset)    Hypertension Father        Tobacco Use    Smoking status: Never Smoker   Substance and Sexual Activity    Alcohol use: Yes     Comment: occ.    Drug use: No    Sexual activity: Yes     Partners: Male     Birth control/protection: See Surgical Hx     Review of Systems   Constitutional: Positive for activity change. Negative for chills and fever.   HENT: Negative for trouble swallowing.    Eyes: Positive for photophobia. Negative for visual disturbance.   Respiratory: Positive for shortness of breath. Negative for chest tightness and wheezing.    Cardiovascular: Positive for chest pain and palpitations. Negative for leg swelling.   Gastrointestinal: Negative for abdominal distention, abdominal pain, anal bleeding, blood in stool, nausea and vomiting.   Genitourinary: Negative for dysuria, hematuria and urgency.   Musculoskeletal: Negative for arthralgias and back pain.   Skin: Negative for rash and wound.   Neurological: Positive for dizziness, weakness, light-headedness and headaches. Negative for numbness.   Psychiatric/Behavioral: Negative for agitation and confusion. The patient is not nervous/anxious.      Objective:     Vital Signs (Most Recent):  Temp: 98 °F (36.7 °C) (10/15/19  1518)  Pulse: 83 (10/15/19 1518)  Resp: 18 (10/15/19 1518)  BP: (!) 171/84 (10/15/19 1518)  SpO2: 100 % (10/15/19 1518) Vital Signs (24h Range):  Temp:  [98 °F (36.7 °C)-98.5 °F (36.9 °C)] 98 °F (36.7 °C)  Pulse:  [] 83  Resp:  [15-20] 18  SpO2:  [98 %-100 %] 100 %  BP: (144-208)/() 171/84     Weight: 81.7 kg (180 lb 1.9 oz)  Body mass index is 32.94 kg/m².    Physical Exam   Constitutional: She is oriented to person, place, and time. She appears well-developed and well-nourished. No distress.   Pleasant female   HENT:   Head: Normocephalic and atraumatic.   Mouth/Throat: No oropharyngeal exudate.   Eyes: Pupils are equal, round, and reactive to light. Conjunctivae and EOM are normal.   Neck: Normal range of motion. Neck supple.   Cardiovascular: Normal rate, regular rhythm, normal heart sounds and intact distal pulses.   Pulmonary/Chest: Effort normal and breath sounds normal. No respiratory distress. She has no wheezes.   Abdominal: Soft. Bowel sounds are normal. She exhibits no distension. There is no tenderness.   Musculoskeletal: Normal range of motion. She exhibits no edema or tenderness.   Lymphadenopathy:     She has no cervical adenopathy.   Neurological: She is alert and oriented to person, place, and time. No cranial nerve deficit or sensory deficit. Coordination normal.   Skin: Skin is warm and dry. Capillary refill takes less than 2 seconds. No rash noted.   Psychiatric: She has a normal mood and affect. Her behavior is normal. Judgment and thought content normal.   Nursing note and vitals reviewed.        CRANIAL NERVES     CN III, IV, VI   Pupils are equal, round, and reactive to light.  Extraocular motions are normal.        Significant Labs:   BMP:   Recent Labs   Lab 10/15/19  1033   GLU 92      K 3.2*      CO2 27   BUN 9   CREATININE 0.8   CALCIUM 9.3     CBC:   Recent Labs   Lab 10/15/19  1033   WBC 5.66   HGB 6.6*   HCT 26.2*   *     All pertinent labs within the  past 24 hours have been reviewed.    Significant Imaging: I have reviewed all pertinent imaging results/findings within the past 24 hours.    Assessment/Plan:     * Symptomatic anemia  Patient with history of fibroids and heavy cycles presents with acute on chronic weakness. Hgb noted to be 6.6 in the ED. Last cycle 2 weeks ago with light spotting since. She has had heavy cycles since the birth of her last child 4 years ago.  - type & screen, consented  - transfuse 1 unit, unable to do 2 due to shortage  - repeat CBC 1 hour after transfusion  - consider second unit if possible  - patient to follow up with GYN to discuss IUD v hysterectomy  - ferritin < 1, ordered Venofer iron infusion     Hypertension, essential  - continue norvasc 5mg daily tomorrow      VTE Risk Mitigation (From admission, onward)         Ordered     IP VTE LOW RISK PATIENT  Once      10/15/19 1234     Place EVI hose  Until discontinued      10/15/19 1234     Place sequential compression device  Until discontinued      10/15/19 1234                   Jun Calero PA-C  Department of Hospital Medicine   [facility]

## 2019-10-15 NOTE — ED PROVIDER NOTES
"Encounter Date: 10/15/2019       History     Chief Complaint   Patient presents with    Palpitations     Pt reports that she has been having Hypertension and heart palpitations that started this weekend. Pt states she is out of her blood pressure medication. Pt reports that she gets slurred speech when her heart races. EKG done in triage.      45yo female with past medical history of hypertension presents the ED for palpitations, blurry vision, shortness of breath, and nausea.  The patient reports that she has been out of amlodipine for about a month.  Starting on Saturday while driving she noticed blurry vision in both eyes and feeling nausea.  She attributed this sensation to her blood pressure so she called a cardiology clinic.  They advised her to go to the ER.  She reports that she was taking care of her children over the weekend so decided to come to the ER today when she took her children to school.  She denies chest pain.  She reports that her heart feels like it is "shaky."  No abd pain or bloody stools. The patient drove herself and was ambulatory into the ED.  No numbness or tingling.  No speech difficulty.  No other complaints at this time.    The history is provided by the patient.     Review of patient's allergies indicates:  No Known Allergies  Past Medical History:   Diagnosis Date    Abnormal Pap smear of cervix     laser ablation    Anemia     Fibroids     Hypertension      Past Surgical History:   Procedure Laterality Date     SECTION      6    DILATION AND CURETTAGE OF UTERUS      LASER ABLATION OF THE CERVIX      Secondary to abnormal pap    TUBAL LIGATION       Family History   Problem Relation Age of Onset    Hypertension Father     Breast cancer Neg Hx     Colon cancer Neg Hx     Ovarian cancer Neg Hx      Social History     Tobacco Use    Smoking status: Never Smoker   Substance Use Topics    Alcohol use: Yes     Comment: occ.    Drug use: No     Review of " Systems   Constitutional: Positive for activity change and appetite change. Negative for chills, fatigue and fever.   Eyes: Positive for visual disturbance.   Respiratory: Positive for shortness of breath. Negative for cough.    Cardiovascular: Positive for palpitations. Negative for chest pain.   Gastrointestinal: Positive for nausea. Negative for abdominal pain, diarrhea and vomiting.   Genitourinary: Negative for dysuria.   Musculoskeletal: Negative for back pain.   Skin: Negative for rash.   Allergic/Immunologic: Negative for immunocompromised state.   Neurological: Positive for headaches. Negative for dizziness, speech difficulty, weakness, light-headedness and numbness.   Hematological: Does not bruise/bleed easily.   Psychiatric/Behavioral: Negative for confusion.   All other systems reviewed and are negative.      Physical Exam     Initial Vitals [10/15/19 0935]   BP Pulse Resp Temp SpO2   (!) 208/102 95 20 98 °F (36.7 °C) 98 %      MAP       --         Physical Exam    Nursing note and vitals reviewed.  Constitutional: She appears well-developed and well-nourished. She is Obese . She is active and cooperative. She is easily aroused.  Non-toxic appearance. She does not have a sickly appearance. She does not appear ill. No distress.   HENT:   Head: Normocephalic and atraumatic.   Eyes: Conjunctivae, EOM and lids are normal. Pupils are equal, round, and reactive to light.   Neck: Trachea normal, normal range of motion, full passive range of motion without pain and phonation normal. Neck supple.   Cardiovascular: Normal rate, regular rhythm and normal heart sounds.   Pulses:       Radial pulses are 2+ on the right side, and 2+ on the left side.        Dorsalis pedis pulses are 2+ on the right side, and 2+ on the left side.   Pulmonary/Chest: Effort normal and breath sounds normal.   Abdominal: Soft. Normal appearance and bowel sounds are normal.   Genitourinary: Pelvic exam was performed with patient in the  knee-chest position.   Genitourinary Comments: Witnessed by ANGI Lewis  No BRB.  Small amount of soft light brown stool.    Musculoskeletal:   No LE edema.    Neurological: She is alert, oriented to person, place, and time and easily aroused. She has normal strength. No cranial nerve deficit or sensory deficit. Coordination and gait normal. GCS eye subscore is 4. GCS verbal subscore is 5. GCS motor subscore is 6.   Skin: Skin is warm, dry and intact. No rash noted.   Psychiatric: She has a normal mood and affect. Her speech is normal and behavior is normal. Judgment and thought content normal. Cognition and memory are normal.         ED Course   Procedures  Labs Reviewed   CBC W/ AUTO DIFFERENTIAL - Abnormal; Notable for the following components:       Result Value    Hemoglobin 6.6 (*)     Hematocrit 26.2 (*)     Mean Corpuscular Volume 59 (*)     Mean Corpuscular Hemoglobin 14.9 (*)     Mean Corpuscular Hemoglobin Conc 25.2 (*)     RDW 21.2 (*)     Platelets 626 (*)     Platelet Estimate Increased (*)     All other components within normal limits   COMPREHENSIVE METABOLIC PANEL - Abnormal; Notable for the following components:    Potassium 3.2 (*)     All other components within normal limits   URINALYSIS, REFLEX TO URINE CULTURE - Abnormal; Notable for the following components:    Protein, UA Trace (*)     Occult Blood UA 2+ (*)     All other components within normal limits    Narrative:     Preferred Collection Type->Urine, Clean Catch   TROPONIN I   B-TYPE NATRIURETIC PEPTIDE   TSH   URINALYSIS MICROSCOPIC    Narrative:     Preferred Collection Type->Urine, Clean Catch   APTT   PROTIME-INR   PROTIME-INR   APTT   OCCULT BLOOD X 1, STOOL   POCT URINE PREGNANCY   TYPE & SCREEN   PREPARE RBC SOFT          Imaging Results          X-Ray Chest 1 View (Final result)  Result time 10/15/19 10:40:51    Final result by Nolan Mustafa MD (10/15/19 10:40:51)                 Impression:      No acute  "findings.      Electronically signed by: Nolan Mustafa MD  Date:    10/15/2019  Time:    10:40             Narrative:    EXAMINATION:  XR CHEST 1 VIEW    CLINICAL HISTORY:  Palpitations    TECHNIQUE:  Single frontal view of the chest was performed.    COMPARISON:  None    FINDINGS:  The cardiomediastinal contour is within normal limits.  Lungs are clear.  No pneumothorax or pleural effusions.                                 Medical Decision Making:   Differential Diagnosis:   Hypertensive urgency, hypertensive emergency, arrhythmia, NSTEMI, electrolyte derangement, dehydration, infection, anemia  Clinical Tests:   Lab Tests: Ordered and Reviewed  Radiological Study: Reviewed and Ordered  Medical Tests: Ordered and Reviewed  ED Management:  Labs, EKG, CXR, amlodipine, ASA, PRBC transfusion  Other:   I have discussed this case with another health care provider.  1100:  Pt's H&H low 6.6/26.2.  Pt states that she has her menstrual cycle every 2 weeks, and it last ended a few days ago.  She currently has light pink "spotting."  She has required PRBC transfusion in the past, and has discussed the need for hysterectomy due to irregular menstrual cycle with her ob/gyn.  Pt denies rectal bleeding and epistaxis.  BP has improved.  11:13 AM  Pt's K mildly decreased. Oral replacement ordered.   Discussed with  who agrees to accept the patient for observation.  Pt agreeable to plan.               Attending Attestation:     Physician Attestation Statement for NP/PA:   I discussed this assessment and plan of this patient with the NP/PA, but I did not personally examine the patient. The face to face encounter was performed by the NP/PA.                     Clinical Impression:       ICD-10-CM ICD-9-CM   1. Symptomatic anemia D64.9 285.9   2. Palpitations R00.2 785.1   3. Hypertensive urgency I16.0 401.9   4. Hypokalemia E87.6 276.8         Disposition:   Disposition: Placed in Observation  Condition: " LEROY Chavez  10/15/19 1227       Sincere Meehan MD  10/15/19 8311

## 2019-10-15 NOTE — HPI
Patient is a 43yo AAF with a PMHx of HTN being admitted to observation for symptomatic anemia. Patient reports chronic weakness due to heavy menstrual cycles since the birth of her last child 4 years ago. However, the last few weeks she reports increased weakness and lethargy to the point of not being able to get out of bed. On Saturday, she noticed blurred vision and nausea with associated palpitations and shortness of breath. Her last period was two weeks ago for which she continues to have mild spotting. She reports a history of fibroids and is not on contraception. She says she was being worked up for possible IUD v hysterectomy. She has had blood transfusions in the past for her anemia. She denies fevers, chills, abdominal pain, and bloody stools. She denies usage of NSAIDs.    In the ED, , /102. Intake labs remarkable for Hgb 6.6. CXR clear. She was type & screened, consented, and given 1 unit RBCs.

## 2019-10-15 NOTE — ED NOTES
Attempted to call report, RN unavailable at this time.  Charge Nurse notified, told to call again in 5 minutes prior to initiating blood transfusion

## 2019-10-15 NOTE — SUBJECTIVE & OBJECTIVE
Past Medical History:   Diagnosis Date    Abnormal Pap smear of cervix     laser ablation    Anemia     Fibroids     Hypertension        Past Surgical History:   Procedure Laterality Date     SECTION      6    DILATION AND CURETTAGE OF UTERUS      LASER ABLATION OF THE CERVIX      Secondary to abnormal pap    TUBAL LIGATION         Review of patient's allergies indicates:  No Known Allergies    No current facility-administered medications on file prior to encounter.      Current Outpatient Medications on File Prior to Encounter   Medication Sig    amLODIPine (NORVASC) 5 MG tablet Take 1 tablet (5 mg total) by mouth once daily.    cetirizine (ZYRTEC) 10 MG tablet Take 1 tablet (10 mg total) by mouth once daily.    ibuprofen (ADVIL,MOTRIN) 600 MG tablet Take 1 tablet (600 mg total) by mouth every 6 (six) hours as needed.    levonorgestrel (MIRENA) 20 mcg/24 hr (5 years) IUD 1 Intra Uterine Device by Intrauterine route once. for 1 dose     Family History     Problem Relation (Age of Onset)    Hypertension Father        Tobacco Use    Smoking status: Never Smoker   Substance and Sexual Activity    Alcohol use: Yes     Comment: occ.    Drug use: No    Sexual activity: Yes     Partners: Male     Birth control/protection: See Surgical Hx     Review of Systems   Constitutional: Positive for activity change. Negative for chills and fever.   HENT: Negative for trouble swallowing.    Eyes: Positive for photophobia. Negative for visual disturbance.   Respiratory: Positive for shortness of breath. Negative for chest tightness and wheezing.    Cardiovascular: Positive for chest pain and palpitations. Negative for leg swelling.   Gastrointestinal: Negative for abdominal distention, abdominal pain, anal bleeding, blood in stool, nausea and vomiting.   Genitourinary: Negative for dysuria, hematuria and urgency.   Musculoskeletal: Negative for arthralgias and back pain.   Skin: Negative for rash and  wound.   Neurological: Positive for dizziness, weakness, light-headedness and headaches. Negative for numbness.   Psychiatric/Behavioral: Negative for agitation and confusion. The patient is not nervous/anxious.      Objective:     Vital Signs (Most Recent):  Temp: 98 °F (36.7 °C) (10/15/19 1518)  Pulse: 83 (10/15/19 1518)  Resp: 18 (10/15/19 1518)  BP: (!) 171/84 (10/15/19 1518)  SpO2: 100 % (10/15/19 1518) Vital Signs (24h Range):  Temp:  [98 °F (36.7 °C)-98.5 °F (36.9 °C)] 98 °F (36.7 °C)  Pulse:  [] 83  Resp:  [15-20] 18  SpO2:  [98 %-100 %] 100 %  BP: (144-208)/() 171/84     Weight: 81.7 kg (180 lb 1.9 oz)  Body mass index is 32.94 kg/m².    Physical Exam   Constitutional: She is oriented to person, place, and time. She appears well-developed and well-nourished. No distress.   Pleasant female   HENT:   Head: Normocephalic and atraumatic.   Mouth/Throat: No oropharyngeal exudate.   Eyes: Pupils are equal, round, and reactive to light. Conjunctivae and EOM are normal.   Neck: Normal range of motion. Neck supple.   Cardiovascular: Normal rate, regular rhythm, normal heart sounds and intact distal pulses.   Pulmonary/Chest: Effort normal and breath sounds normal. No respiratory distress. She has no wheezes.   Abdominal: Soft. Bowel sounds are normal. She exhibits no distension. There is no tenderness.   Musculoskeletal: Normal range of motion. She exhibits no edema or tenderness.   Lymphadenopathy:     She has no cervical adenopathy.   Neurological: She is alert and oriented to person, place, and time. No cranial nerve deficit or sensory deficit. Coordination normal.   Skin: Skin is warm and dry. Capillary refill takes less than 2 seconds. No rash noted.   Psychiatric: She has a normal mood and affect. Her behavior is normal. Judgment and thought content normal.   Nursing note and vitals reviewed.        CRANIAL NERVES     CN III, IV, VI   Pupils are equal, round, and reactive to light.  Extraocular  motions are normal.        Significant Labs:   BMP:   Recent Labs   Lab 10/15/19  1033   GLU 92      K 3.2*      CO2 27   BUN 9   CREATININE 0.8   CALCIUM 9.3     CBC:   Recent Labs   Lab 10/15/19  1033   WBC 5.66   HGB 6.6*   HCT 26.2*   *     All pertinent labs within the past 24 hours have been reviewed.    Significant Imaging: I have reviewed all pertinent imaging results/findings within the past 24 hours.

## 2019-10-15 NOTE — ED NOTES
Blurry vision that began on Saturday while driving, SOB and high blood pressure with occasional nausea.  Has been out of amlodipine for last month.  NASIR at bedside.  Hx of migraines and states that she has a headache that is not as bad as her usual migraines.

## 2019-10-15 NOTE — ASSESSMENT & PLAN NOTE
Patient with history of fibroids and heavy cycles presents with acute on chronic weakness. Hgb noted to be 6.6 in the ED. Last cycle 2 weeks ago with light spotting since. She has had heavy cycles since the birth of her last child 4 years ago.  - type & screen, consented  - transfuse 1 unit, unable to do 2 due to shortage  - repeat CBC 1 hour after transfusion  - consider second unit if possible  - patient to follow up with GYN to discuss IUD v hysterectomy  - ferritin < 1, ordered Venofer iron infusion

## 2019-10-16 ENCOUNTER — TELEPHONE (OUTPATIENT)
Dept: INTERNAL MEDICINE | Facility: CLINIC | Age: 44
End: 2019-10-16

## 2019-10-16 VITALS
WEIGHT: 180.13 LBS | SYSTOLIC BLOOD PRESSURE: 189 MMHG | HEART RATE: 79 BPM | DIASTOLIC BLOOD PRESSURE: 97 MMHG | HEIGHT: 62 IN | TEMPERATURE: 98 F | RESPIRATION RATE: 18 BRPM | BODY MASS INDEX: 33.15 KG/M2 | OXYGEN SATURATION: 100 %

## 2019-10-16 DIAGNOSIS — R00.2 PALPITATIONS: Primary | ICD-10-CM

## 2019-10-16 DIAGNOSIS — R79.89 ABNORMAL COMPLETE BLOOD COUNT: Primary | ICD-10-CM

## 2019-10-16 LAB
ANION GAP SERPL CALC-SCNC: 10 MMOL/L (ref 8–16)
ANISOCYTOSIS BLD QL SMEAR: SLIGHT
BASOPHILS # BLD AUTO: 0.02 K/UL (ref 0–0.2)
BASOPHILS NFR BLD: 0.3 % (ref 0–1.9)
BUN SERPL-MCNC: 8 MG/DL (ref 6–20)
BURR CELLS BLD QL SMEAR: ABNORMAL
CALCIUM SERPL-MCNC: 9.3 MG/DL (ref 8.7–10.5)
CHLORIDE SERPL-SCNC: 105 MMOL/L (ref 95–110)
CO2 SERPL-SCNC: 23 MMOL/L (ref 23–29)
CREAT SERPL-MCNC: 0.8 MG/DL (ref 0.5–1.4)
DACRYOCYTES BLD QL SMEAR: ABNORMAL
DIFFERENTIAL METHOD: ABNORMAL
EOSINOPHIL # BLD AUTO: 0.1 K/UL (ref 0–0.5)
EOSINOPHIL NFR BLD: 0.9 % (ref 0–8)
ERYTHROCYTE [DISTWIDTH] IN BLOOD BY AUTOMATED COUNT: 24.2 % (ref 11.5–14.5)
EST. GFR  (AFRICAN AMERICAN): >60 ML/MIN/1.73 M^2
EST. GFR  (NON AFRICAN AMERICAN): >60 ML/MIN/1.73 M^2
GLUCOSE SERPL-MCNC: 113 MG/DL (ref 70–110)
HCT VFR BLD AUTO: 29.4 % (ref 37–48.5)
HGB BLD-MCNC: 7.8 G/DL (ref 12–16)
HYPOCHROMIA BLD QL SMEAR: ABNORMAL
LYMPHOCYTES # BLD AUTO: 1.3 K/UL (ref 1–4.8)
LYMPHOCYTES NFR BLD: 19.8 % (ref 18–48)
MCH RBC QN AUTO: 16.5 PG (ref 27–31)
MCHC RBC AUTO-ENTMCNC: 26.5 G/DL (ref 32–36)
MCV RBC AUTO: 62 FL (ref 82–98)
MONOCYTES # BLD AUTO: 0.3 K/UL (ref 0.3–1)
MONOCYTES NFR BLD: 3.9 % (ref 4–15)
NEUTROPHILS # BLD AUTO: 4.8 K/UL (ref 1.8–7.7)
NEUTROPHILS NFR BLD: 75.1 % (ref 38–73)
OVALOCYTES BLD QL SMEAR: ABNORMAL
PLATELET # BLD AUTO: 513 K/UL (ref 150–350)
PLATELET BLD QL SMEAR: ABNORMAL
PMV BLD AUTO: 9.3 FL (ref 9.2–12.9)
POIKILOCYTOSIS BLD QL SMEAR: SLIGHT
POTASSIUM SERPL-SCNC: 3.6 MMOL/L (ref 3.5–5.1)
RBC # BLD AUTO: 4.72 M/UL (ref 4–5.4)
SODIUM SERPL-SCNC: 138 MMOL/L (ref 136–145)
TARGETS BLD QL SMEAR: ABNORMAL
WBC # BLD AUTO: 6.4 K/UL (ref 3.9–12.7)

## 2019-10-16 PROCEDURE — G0378 HOSPITAL OBSERVATION PER HR: HCPCS

## 2019-10-16 PROCEDURE — 99217 PR OBSERVATION CARE DISCHARGE: CPT | Mod: ,,, | Performed by: PHYSICIAN ASSISTANT

## 2019-10-16 PROCEDURE — 80048 BASIC METABOLIC PNL TOTAL CA: CPT

## 2019-10-16 PROCEDURE — 36415 COLL VENOUS BLD VENIPUNCTURE: CPT

## 2019-10-16 PROCEDURE — 99217 PR OBSERVATION CARE DISCHARGE: ICD-10-PCS | Mod: ,,, | Performed by: PHYSICIAN ASSISTANT

## 2019-10-16 PROCEDURE — 25000003 PHARM REV CODE 250: Performed by: PHYSICIAN ASSISTANT

## 2019-10-16 RX ORDER — AMLODIPINE BESYLATE 10 MG/1
10 TABLET ORAL DAILY
Qty: 30 TABLET | Refills: 11 | Status: SHIPPED | OUTPATIENT
Start: 2019-10-16 | End: 2020-10-20 | Stop reason: SDUPTHER

## 2019-10-16 RX ORDER — AMLODIPINE BESYLATE 5 MG/1
5 TABLET ORAL ONCE
Status: COMPLETED | OUTPATIENT
Start: 2019-10-16 | End: 2019-10-16

## 2019-10-16 RX ADMIN — AMLODIPINE BESYLATE 5 MG: 5 TABLET ORAL at 09:10

## 2019-10-16 RX ADMIN — AMLODIPINE BESYLATE 5 MG: 5 TABLET ORAL at 11:10

## 2019-10-16 NOTE — TELEPHONE ENCOUNTER
----- Message from Amy Flower RN sent at 10/16/2019  9:51 AM CDT -----  Contact: Amy WHITLEY case manager  Pt will d/c today.  Received 2 u PRBC for symptomatic anemia.  Has f/u 10/29.  If you'd like her to get labs prior please schedule and let pt know.  thanks

## 2019-10-16 NOTE — DISCHARGE SUMMARY
[facility] Hospital Medicine  Discharge Summary      Patient Name: Debbi Zhu  MRN: [id number]  Admission Date: 10/15/2019  Hospital Length of Stay: 0 days  Discharge Date and Time: 10/16/2019 12:35 PM  Attending Physician: No att. providers found   Discharging Provider: Jun Calero PA-C  Primary Care Provider: Shamir Villegas MD      HPI:   Patient is a 45yo AAF with a PMHx of HTN being admitted to observation for symptomatic anemia. Patient reports chronic weakness due to heavy menstrual cycles since the birth of her last child 4 years ago. However, the last few weeks she reports increased weakness and lethargy to the point of not being able to get out of bed. On Saturday, she noticed blurred vision and nausea with associated palpitations and shortness of breath. Her last period was two weeks ago for which she continues to have mild spotting. She reports a history of fibroids and is not on contraception. She says she was being worked up for possible IUD v hysterectomy. She has had blood transfusions in the past for her anemia. She denies fevers, chills, abdominal pain, and bloody stools. She denies usage of NSAIDs.    In the ED, , /102. Intake labs remarkable for Hgb 6.6. CXR clear. She was type & screened, consented, and given 1 unit RBCs.    * No surgery found *      Hospital Course:   Patient admitted to observation for symptomatic anemia. She was transfused 1 unit RBCs. Hgb 6.6->7.9. Patient to follow up with gynecology for discussion of hysterectomy. Patient stable for discharge, amlodipine increased to 10mg for better BP control.     Consults:     * Symptomatic anemia  Patient with history of fibroids and heavy cycles presents with acute on chronic weakness. Hgb noted to be 6.6 in the ED. Last cycle 2 weeks ago with light spotting since. She has had heavy cycles since the birth of her last child 4 years ago.  - type & screen, consented  - transfuse 1 unit, HGb  increased to 7.9  - repeat CBC 1 hour after transfusion  - patient to follow up with GYN to discuss IUD v hysterectomy  - ferritin < 1, ordered Venofer iron infusion \  - start iron supplements on discharge    Hypertension, essential  - increase norvasc to 10mg on discharge      Final Active Diagnoses:    Diagnosis Date Noted POA    PRINCIPAL PROBLEM:  Symptomatic anemia [D64.9] 10/15/2019 Yes    Hypertension, essential [I10] 02/27/2015 Yes      Problems Resolved During this Admission:       Discharged Condition: good    Disposition: Home or Self Care    Follow Up:  Follow-up Information     Shamir Villegas MD On 10/29/2019.    Specialties:  Family Medicine, Sports Medicine  Why:  10:40 am , For Hospital Follow-up  Contact information:  [address] [facility] [address]  [phone]             Emmanuelle Davies MD On 10/30/2019.    Specialty:  Obstetrics and Gynecology  Why:  9:15 am , For Hospital Follow-up  Contact information:  [address] [facility] [address]  [phone]                 Patient Instructions:      Diet Cardiac     Notify your health care provider if you experience any of the following:  increased confusion or weakness     Notify your health care provider if you experience any of the following:  severe uncontrolled pain     Notify your health care provider if you experience any of the following:  persistent nausea and vomiting or diarrhea     Notify your health care provider if you experience any of the following:  difficulty breathing or increased cough     Activity as tolerated       Significant Diagnostic Studies: Labs: All labs within the past 24 hours have been reviewed    Pending Diagnostic Studies:     None         Medications:  Reconciled Home Medications:      Medication List      START taking these medications    ferrous sulfate 325 mg (65 mg iron) Tab tablet  Commonly known as:  FEOSOL  Take 1 tablet (325 mg total) by mouth 3 (three) times daily with meals.         CHANGE how you take these medications    amLODIPine 10 MG tablet  Commonly known as:  NORVASC  Take 1 tablet (10 mg total) by mouth once daily.  What changed:    · medication strength  · how much to take        STOP taking these medications    cetirizine 10 MG tablet  Commonly known as:  ZYRTEC     ibuprofen 600 MG tablet  Commonly known as:  ADVIL,MOTRIN     levonorgestrel 20 mcg/24 hours (5 yrs) 52 mg IUD  Commonly known as:  MIRENA            Indwelling Lines/Drains at time of discharge:   Lines/Drains/Airways     None                 Time spent on the discharge of patient: 32 minutes  Patient was seen and examined on the date of discharge and determined to be suitable for discharge.         Jun Calero PA-C  Department of Hospital Medicine  [facility]

## 2019-10-16 NOTE — ASSESSMENT & PLAN NOTE
Patient with history of fibroids and heavy cycles presents with acute on chronic weakness. Hgb noted to be 6.6 in the ED. Last cycle 2 weeks ago with light spotting since. She has had heavy cycles since the birth of her last child 4 years ago.  - type & screen, consented  - transfuse 1 unit, HGb increased to 7.9  - repeat CBC 1 hour after transfusion  - patient to follow up with GYN to discuss IUD v hysterectomy  - ferritin < 1, ordered Venofer iron infusion \  - start iron supplements on discharge

## 2019-10-16 NOTE — NURSING
"FIDEL Sanders is aware of pt elevated BP. Stated "It will take time with amlodipine to regulate it. PA ok with d/c of pt.   "

## 2019-10-16 NOTE — HOSPITAL COURSE
Patient admitted to observation for symptomatic anemia. She was transfused 1 unit RBCs. Hgb 6.6->7.9. Patient to follow up with gynecology for discussion of hysterectomy. Patient stable for discharge, amlodipine increased to 10mg for better BP control.

## 2019-10-16 NOTE — PLAN OF CARE
10/16/2019      Debbi Zhu  [address] [facility] [address]          [facility] Medicine Dept.  [facility] [address] [place] [id number] [phone] after hours  [phone] fax Debbi Zhu has been hospitalized at the [facility] since 10/15/2019.  Please excuse the patient from duties.  Patient may return on 10/17/19.  No restrictions.     Please contact me if you have any questions.                  __________________________  Jun Calero PA-C  10/16/2019

## 2019-10-16 NOTE — PLAN OF CARE
D/C rounds complete. All questions answered.  Nurse to discuss d/c medications.  Discussed need to keep f/u appts, adherence to medication regimen for health maintenance, verbalized understanding.     10/16/19 1002   Final Note   Assessment Type Final Discharge Note   Anticipated Discharge Disposition Home   Hospital Follow Up  Appt(s) scheduled? Yes   Discharge plans and expectations educations in teach back method with documentation complete? Yes   Right Care Referral Info   Post Acute Recommendation No Care       Amy Flower, RN    [phone]

## 2019-10-16 NOTE — PLAN OF CARE
Visit with pt for d/c planning, independent with ADL's.  Pt has no needs at this time, will drive self home.  Encouraged to take iron supplement, arranged to have pharmacy bring to bedside prior to d/c.    Discharge planning brochure provided. White board updated with CM name & contact info.  Pt encouraged to call with any questions or needs. CM will continue to follow patient throughout the transitions of care, and assist with any discharge needs.       10/16/19 0958   Discharge Assessment   Assessment Type Discharge Planning Assessment   Confirmed/corrected address and phone number on facesheet? Yes   Assessment information obtained from? Patient   Expected Length of Stay (days) 1   Communicated expected length of stay with patient/caregiver yes   Prior to hospitilization cognitive status: Alert/Oriented   Prior to hospitalization functional status: Independent   Current cognitive status: Alert/Oriented   Current Functional Status: Independent   Lives With spouse   Able to Return to Prior Arrangements yes   Is patient able to care for self after discharge? Yes   Readmission Within the Last 30 Days no previous admission in last 30 days   Patient currently being followed by outpatient case management? No   Patient currently receives any other outside agency services? No   Equipment Currently Used at Home none   Do you have any problems affording any of your prescribed medications? No   Is the patient taking medications as prescribed? yes   Does the patient have transportation home? Yes   Transportation Anticipated car, drives self   Does the patient receive services at the [facility]? No   Discharge Plan A Home   Discharge Plan B Home with family   DME Needed Upon Discharge  none   Patient/Family in Agreement with Plan yes

## 2019-10-16 NOTE — PLAN OF CARE
Pt AAO x 4.  VSS.  Pt remained afebrile throughout this shift.   Pt remained free of falls this shift.   Pt had no c/o pain this shift.  Plan of care reviewed. Patient verbalizes understanding.   Pt moving/turing independently. Frequent weight shifting encouraged.  Patient  SR on monitor.   Bed low, side rails up x 3, wheels locked, call light in reach.   Bed alarm maintained for safety.   Patient instructed to call for assistance.   Hourly rounding completed.   Will continue to monitor.

## 2019-10-16 NOTE — NURSING
Pt AAO  VSS NAD.   Tele box removed. IV removed.   Educated pt on medications, when to take, how to take, side effects  Pt verbalized understanding  Waiting for pharmacy to bring medication.

## 2019-10-18 ENCOUNTER — LAB VISIT (OUTPATIENT)
Dept: LAB | Facility: HOSPITAL | Age: 44
End: 2019-10-18
Attending: PHYSICIAN ASSISTANT
Payer: COMMERCIAL

## 2019-10-18 ENCOUNTER — OFFICE VISIT (OUTPATIENT)
Dept: INTERNAL MEDICINE | Facility: CLINIC | Age: 44
End: 2019-10-18
Payer: COMMERCIAL

## 2019-10-18 ENCOUNTER — PATIENT MESSAGE (OUTPATIENT)
Dept: INTERNAL MEDICINE | Facility: CLINIC | Age: 44
End: 2019-10-18

## 2019-10-18 ENCOUNTER — NURSE TRIAGE (OUTPATIENT)
Dept: ADMINISTRATIVE | Facility: CLINIC | Age: 44
End: 2019-10-18

## 2019-10-18 VITALS
HEIGHT: 62 IN | HEART RATE: 82 BPM | WEIGHT: 181.44 LBS | SYSTOLIC BLOOD PRESSURE: 152 MMHG | DIASTOLIC BLOOD PRESSURE: 90 MMHG | BODY MASS INDEX: 33.39 KG/M2

## 2019-10-18 DIAGNOSIS — D64.9 ANEMIA, UNSPECIFIED TYPE: Primary | ICD-10-CM

## 2019-10-18 DIAGNOSIS — D64.9 ANEMIA, UNSPECIFIED TYPE: ICD-10-CM

## 2019-10-18 DIAGNOSIS — R11.2 NON-INTRACTABLE VOMITING WITH NAUSEA, UNSPECIFIED VOMITING TYPE: ICD-10-CM

## 2019-10-18 LAB
BASOPHILS # BLD AUTO: 0.06 K/UL (ref 0–0.2)
BASOPHILS NFR BLD: 0.6 % (ref 0–1.9)
DIFFERENTIAL METHOD: ABNORMAL
EOSINOPHIL # BLD AUTO: 0.1 K/UL (ref 0–0.5)
EOSINOPHIL NFR BLD: 0.7 % (ref 0–8)
ERYTHROCYTE [DISTWIDTH] IN BLOOD BY AUTOMATED COUNT: 27.7 % (ref 11.5–14.5)
HCT VFR BLD AUTO: 30.8 % (ref 37–48.5)
HGB BLD-MCNC: 8.7 G/DL (ref 12–16)
LYMPHOCYTES # BLD AUTO: 2.5 K/UL (ref 1–4.8)
LYMPHOCYTES NFR BLD: 26.5 % (ref 18–48)
MCH RBC QN AUTO: 17.6 PG (ref 27–31)
MCHC RBC AUTO-ENTMCNC: 28.2 G/DL (ref 32–36)
MCV RBC AUTO: 62 FL (ref 82–98)
MONOCYTES # BLD AUTO: 0.4 K/UL (ref 0.3–1)
MONOCYTES NFR BLD: 4.4 % (ref 4–15)
NEUTROPHILS # BLD AUTO: 6.4 K/UL (ref 1.8–7.7)
NEUTROPHILS NFR BLD: 67.8 % (ref 38–73)
PLATELET # BLD AUTO: 561 K/UL (ref 150–350)
PMV BLD AUTO: 9.5 FL (ref 9.2–12.9)
RBC # BLD AUTO: 4.94 M/UL (ref 4–5.4)
WBC # BLD AUTO: 9.52 K/UL (ref 3.9–12.7)

## 2019-10-18 PROCEDURE — 99999 PR PBB SHADOW E&M-EST. PATIENT-LVL III: ICD-10-PCS | Mod: PBBFAC,,, | Performed by: PHYSICIAN ASSISTANT

## 2019-10-18 PROCEDURE — 96372 THER/PROPH/DIAG INJ SC/IM: CPT | Mod: S$GLB,,, | Performed by: PHYSICIAN ASSISTANT

## 2019-10-18 PROCEDURE — 3008F PR BODY MASS INDEX (BMI) DOCUMENTED: ICD-10-PCS | Mod: CPTII,S$GLB,, | Performed by: PHYSICIAN ASSISTANT

## 2019-10-18 PROCEDURE — 85025 COMPLETE CBC W/AUTO DIFF WBC: CPT

## 2019-10-18 PROCEDURE — 99213 OFFICE O/P EST LOW 20 MIN: CPT | Mod: 25,S$GLB,, | Performed by: PHYSICIAN ASSISTANT

## 2019-10-18 PROCEDURE — 36415 COLL VENOUS BLD VENIPUNCTURE: CPT

## 2019-10-18 PROCEDURE — 96372 PR INJECTION,THERAP/PROPH/DIAG2ST, IM OR SUBCUT: ICD-10-PCS | Mod: S$GLB,,, | Performed by: PHYSICIAN ASSISTANT

## 2019-10-18 PROCEDURE — 99999 PR PBB SHADOW E&M-EST. PATIENT-LVL III: CPT | Mod: PBBFAC,,, | Performed by: PHYSICIAN ASSISTANT

## 2019-10-18 PROCEDURE — 3008F BODY MASS INDEX DOCD: CPT | Mod: CPTII,S$GLB,, | Performed by: PHYSICIAN ASSISTANT

## 2019-10-18 PROCEDURE — 99213 PR OFFICE/OUTPT VISIT, EST, LEVL III, 20-29 MIN: ICD-10-PCS | Mod: 25,S$GLB,, | Performed by: PHYSICIAN ASSISTANT

## 2019-10-18 RX ORDER — ONDANSETRON 4 MG/1
4 TABLET, ORALLY DISINTEGRATING ORAL EVERY 6 HOURS PRN
Qty: 30 TABLET | Refills: 0 | Status: SHIPPED | OUTPATIENT
Start: 2019-10-18 | End: 2019-11-11

## 2019-10-18 RX ORDER — ONDANSETRON 2 MG/ML
4 INJECTION INTRAMUSCULAR; INTRAVENOUS ONCE
Status: COMPLETED | OUTPATIENT
Start: 2019-10-18 | End: 2019-10-18

## 2019-10-18 RX ADMIN — ONDANSETRON 4 MG: 2 INJECTION INTRAMUSCULAR; INTRAVENOUS at 11:10

## 2019-10-18 NOTE — PROGRESS NOTES
Subjective:       Patient ID: Debbi Zhu is a 44 y.o. female.    Chief Complaint: Nausea and Blurred Vision    Patient presents with one day of headache, nausea, vomiting and blurred vision. Last night she got a shooting pain up her back that then lead to migraine, nausea and vomiting. She was admitted to the hospital on Tuesday, 10/15, for similar symptoms and found to have hemoglobin of 6.6 and /102. She was given a blood transfusion and discharged 10/16 with hemoglobin of 7.9. The hospitalist increased her amlodipine from 5mg to 10mg. After going home she was resting comfortably until the shooting back pain, headache, nausea, and vomiting began last night.     Review of Systems   Constitutional: Positive for appetite change. Negative for activity change, chills, fatigue and fever.   HENT: Negative for congestion, ear pain, postnasal drip, rhinorrhea, sinus pressure, sinus pain and sore throat.    Eyes: Negative for pain and visual disturbance.   Respiratory: Negative for cough, chest tightness and shortness of breath.    Cardiovascular: Negative for chest pain and palpitations.   Gastrointestinal: Positive for nausea and vomiting. Negative for abdominal distention, abdominal pain, constipation and diarrhea.   Genitourinary: Negative for difficulty urinating, dysuria, flank pain and urgency.   Musculoskeletal: Positive for back pain. Negative for arthralgias and myalgias.   Skin: Negative for color change and rash.   Neurological: Positive for weakness and headaches. Negative for dizziness, light-headedness and numbness.       Objective:      Physical Exam   Constitutional: She is oriented to person, place, and time. She appears well-developed and well-nourished. No distress.   Patient is very cheerful in the room, pleasant and joking   HENT:   Head: Normocephalic and atraumatic.   Mouth/Throat: No oropharyngeal exudate.   Eyes: Pupils are equal, round, and reactive to light. Conjunctivae and EOM are  normal. Scleral icterus is present.   Decreased visual acuity bilaterally.   Neck: Normal range of motion. Neck supple.   Cardiovascular: Normal rate, regular rhythm and normal heart sounds.   No murmur heard.  Pulmonary/Chest: Effort normal and breath sounds normal. No respiratory distress. She has no wheezes.   Abdominal: Soft. Bowel sounds are normal. She exhibits no distension.   Musculoskeletal: Normal range of motion. She exhibits no edema or deformity.   Neurological: She is alert and oriented to person, place, and time. She displays normal reflexes. No cranial nerve deficit or sensory deficit. She exhibits normal muscle tone. Coordination normal.   Skin: Skin is warm and dry. No rash noted.   Psychiatric: She has a normal mood and affect. Her behavior is normal. Judgment and thought content normal.       Assessment:       1. Anemia, unspecified type    2. Non-intractable vomiting with nausea, unspecified vomiting type        Plan:       Debbi was seen today for nausea and blurred vision.    Diagnoses and all orders for this visit:    Anemia, unspecified type  -     CBC auto differential; Future    Non-intractable vomiting with nausea, unspecified vomiting type  -     ondansetron injection 4 mg  -     ondansetron (ZOFRAN-ODT) 4 MG TbDL; Take 1 tablet (4 mg total) by mouth every 6 (six) hours as needed.    Will call patient with CBC results. Advised patient if H&H is low again she may need to return to the ED.

## 2019-10-18 NOTE — PROGRESS NOTES
Pharmacy New Medication Education    Patient and/or Caregiver ACCEPTED medication education.    Pharmacy has provided education on the name, indication, and possible side effects of the medication(s) prescribed, using teach-back method.     Learners of pharmacy medication education includes:  patient      The following medications have also been discussed, during this admission.     Current Facility-Administered Medications   Medication Frequency    ondansetron injection 4 mg Once     Current Outpatient Medications   Medication Sig    amLODIPine (NORVASC) 10 MG tablet Take 1 tablet (10 mg total) by mouth once daily.    ferrous sulfate (FEOSOL) 325 mg (65 mg iron) Tab tablet Take 1 tablet (325 mg total) by mouth 3 (three) times daily with meals.    ondansetron (ZOFRAN-ODT) 4 MG TbDL Take 1 tablet (4 mg total) by mouth every 6 (six) hours as needed.          Thank you  oCrey Chou, PharmD

## 2019-10-18 NOTE — TELEPHONE ENCOUNTER
"Was admitted on Tuesday and discharged on Wed. Got a blood transfusion and blood pressure medication was changed. Had a pain at 11 pm and pain went up spine to head and vomited twice. The pain shot up back to head and was gone. Not vomiting at this time.     Reason for Disposition   [1] Vomiting AND [2] 2 or more times (Exception: similar to previous migraines)    Additional Information   Negative: Difficult to awaken or acting confused (e.g., disoriented, slurred speech)   Negative: [1] Weakness of the face, arm or leg on one side of the body AND [2] new onset   Negative: [1] Numbness of the face, arm or leg on one side of the body AND [2] new onset   Negative: [1] Loss of speech or garbled speech AND [2] new onset   Negative: Passed out (i.e., lost consciousness, collapsed and was not responding)   Negative: Sounds like a life-threatening emergency to the triager   Negative: Followed a head injury within last 3 days   Negative: Pregnant   Negative: Traumatic Brain Injury (TBI) is suspected   Negative: Unable to walk, or can only walk with assistance (e.g., requires support)   Negative: Stiff neck (can't touch chin to chest)   Negative: Severe pain in one eye   Negative: [1] Other family members (or roommates) with headaches AND [2] possibility of carbon monoxide exposure   Negative: [1] SEVERE headache (e.g., excruciating) AND [2] "worst headache" of life   Negative: [1] SEVERE headache AND [2] sudden-onset (i.e., reaching maximum intensity within seconds)   Negative: [1] SEVERE headache AND [2] fever   Negative: Loss of vision or double vision (Exception: same as prior migraines)   Negative: [1] Fever > 100.0 F (37.8 C) AND [2] diabetes mellitus or weak immune system (e.g., HIV positive, cancer chemo, splenectomy, chronic steroids)   Negative: Patient sounds very sick or weak to the triager    Protocols used: HEADACHE-A-AH      "

## 2019-10-18 NOTE — TELEPHONE ENCOUNTER
Spoke with pt, she says she was laying in the bed on Thursday night land she felt a pian shoot up her spine. She began to have a migraine, that caused her to throw up twice. She was discahrged from the hospital on Wednesday she says her BP was high when they discharged her. Pt was unable to check BP at this time.   She says this morning her head is still hurting but she has not thrown up since Thursday. She does not have the pain in her spine. scheduled  appt

## 2019-10-21 ENCOUNTER — PATIENT OUTREACH (OUTPATIENT)
Dept: OTHER | Facility: OTHER | Age: 44
End: 2019-10-21

## 2019-10-21 NOTE — PROGRESS NOTES
"Digital Medicine: Health  Introduction    Introduced Debbi Zhu to Digital Medicine. Discussed health  role and recommended lifestyle modifications.    Mrs. Zhu reports of recently being in the ED with a b/p in the 200/100 range. She states it was a " big wake up call " and would like to make lifestyle modifications to reach her b/p goal of <130/80. Patient reports of doing " ok " with her current medications. She denies any side effects or hypertensive symptoms.           Last 5 Patient Entered Readings                                      Current 30 Day Average: 149/96     Recent Readings 10/21/2019 10/20/2019 10/19/2019 10/19/2019 10/18/2019    SBP (mmHg) 162 142 144 130 149    DBP (mmHg) 96 91 97 91 104    Pulse 76 76 76 72 86                Diet:   Patient reports eating or drinking the following: fast food, soda and packaged/processed foodsShe does not skip meals regularly.      The patient states that she typically eats a non-home cooked meal (ex: dining out, take out, frozen meals) 5 or more times per week.      Barriers to a Healthy Diet: time/convenience    Patient states that she frequently picks up a sausage biscuit from [employer] for breakfast.    Patient works from home. Her  usually picks up [employer] or [employer] for lunch.    Patient states that she often chews on ice due to her anemia. She denies drinking water and drinks diet coke instead.     Intervention(s): meal planning, help with shopping, low sodium diet education, regularly scheduling meals, reducing sodium intake, reading food labels, sodium reduction while dining out, reducing processed foods, increasing water intake and DASH diet    Assigning the following patient goals: meal plan, maintain low sodium diet and grocery shopping/making a shopping list    Physical Activity:   When asked if exercising, patient responded: no    She identified the following barriers to physical activity: lack of time    Patient " states that she works remotely and is on the computer from 8:30a.m.-5:30p.m. and is currently enrolled in [employer] from 6-9:30 pm    Intervention(s): exercise ideas and behavior change     Assigning the following patient goal(s): increase physical activity, 150 minutes of exercise per week and participate in exercise weekly      [facility]    INTERVENTION(S)  reviewed appropriate dose schedule, recommended diet modifications, recommend physical activity, reviewed monitoring technique, encouragement/support and goal setting    PLAN  patient verbalizes understanding    Encouraged patient to set dietary modifications.    Encouraged physical activity.     Will discuss goals in 4 weeks.          There are no preventive care reminders to display for this patient.    Reviewed the importance of self-monitoring, medication adherence, and that the health  can be used as a resource for lifestyle modifications to help reduce or maintain a healthy lifestyle.    Sent link to [facility] webpages and my contact information via [employer] for future questions. Follow up scheduled.

## 2019-10-28 ENCOUNTER — PATIENT OUTREACH (OUTPATIENT)
Dept: ADMINISTRATIVE | Facility: OTHER | Age: 44
End: 2019-10-28

## 2019-10-28 ENCOUNTER — LAB VISIT (OUTPATIENT)
Dept: LAB | Facility: HOSPITAL | Age: 44
End: 2019-10-28
Attending: FAMILY MEDICINE
Payer: COMMERCIAL

## 2019-10-28 DIAGNOSIS — Z12.31 ENCOUNTER FOR SCREENING MAMMOGRAM FOR MALIGNANT NEOPLASM OF BREAST: Primary | ICD-10-CM

## 2019-10-28 DIAGNOSIS — R79.89 ABNORMAL COMPLETE BLOOD COUNT: ICD-10-CM

## 2019-10-28 LAB
ERYTHROCYTE [DISTWIDTH] IN BLOOD BY AUTOMATED COUNT: 34.1 % (ref 11.5–14.5)
HCT VFR BLD AUTO: 37 % (ref 37–48.5)
HGB BLD-MCNC: 9.5 G/DL (ref 12–16)
MCH RBC QN AUTO: 18.6 PG (ref 27–31)
MCHC RBC AUTO-ENTMCNC: 25.7 G/DL (ref 32–36)
MCV RBC AUTO: 72 FL (ref 82–98)
PLATELET # BLD AUTO: 306 K/UL (ref 150–350)
PMV BLD AUTO: ABNORMAL FL (ref 9.2–12.9)
RBC # BLD AUTO: 5.12 M/UL (ref 4–5.4)
WBC # BLD AUTO: 6.62 K/UL (ref 3.9–12.7)

## 2019-10-28 PROCEDURE — 36415 COLL VENOUS BLD VENIPUNCTURE: CPT

## 2019-10-28 PROCEDURE — 85027 COMPLETE CBC AUTOMATED: CPT

## 2019-10-29 ENCOUNTER — OFFICE VISIT (OUTPATIENT)
Dept: INTERNAL MEDICINE | Facility: CLINIC | Age: 44
End: 2019-10-29
Attending: FAMILY MEDICINE
Payer: COMMERCIAL

## 2019-10-29 ENCOUNTER — DOCUMENTATION ONLY (OUTPATIENT)
Dept: INTERNAL MEDICINE | Facility: CLINIC | Age: 44
End: 2019-10-29

## 2019-10-29 VITALS
HEIGHT: 62 IN | SYSTOLIC BLOOD PRESSURE: 140 MMHG | OXYGEN SATURATION: 100 % | WEIGHT: 185.19 LBS | TEMPERATURE: 98 F | DIASTOLIC BLOOD PRESSURE: 92 MMHG | HEART RATE: 72 BPM | BODY MASS INDEX: 34.08 KG/M2

## 2019-10-29 DIAGNOSIS — Z01.818 PREOPERATIVE CLEARANCE: ICD-10-CM

## 2019-10-29 DIAGNOSIS — Z12.11 COLON CANCER SCREENING: ICD-10-CM

## 2019-10-29 DIAGNOSIS — D64.9 SYMPTOMATIC ANEMIA: Primary | ICD-10-CM

## 2019-10-29 DIAGNOSIS — I10 HYPERTENSION, ESSENTIAL: ICD-10-CM

## 2019-10-29 PROCEDURE — 99214 OFFICE O/P EST MOD 30 MIN: CPT | Mod: S$GLB,,, | Performed by: FAMILY MEDICINE

## 2019-10-29 PROCEDURE — 3008F BODY MASS INDEX DOCD: CPT | Mod: CPTII,S$GLB,, | Performed by: FAMILY MEDICINE

## 2019-10-29 PROCEDURE — 3008F PR BODY MASS INDEX (BMI) DOCUMENTED: ICD-10-PCS | Mod: CPTII,S$GLB,, | Performed by: FAMILY MEDICINE

## 2019-10-29 PROCEDURE — 99999 PR PBB SHADOW E&M-EST. PATIENT-LVL III: CPT | Mod: PBBFAC,,, | Performed by: FAMILY MEDICINE

## 2019-10-29 PROCEDURE — 99999 PR PBB SHADOW E&M-EST. PATIENT-LVL III: ICD-10-PCS | Mod: PBBFAC,,, | Performed by: FAMILY MEDICINE

## 2019-10-29 PROCEDURE — 99214 PR OFFICE/OUTPT VISIT, EST, LEVL IV, 30-39 MIN: ICD-10-PCS | Mod: S$GLB,,, | Performed by: FAMILY MEDICINE

## 2019-10-29 RX ORDER — HYDROCHLOROTHIAZIDE 12.5 MG/1
12.5 TABLET ORAL DAILY
Qty: 90 TABLET | Refills: 1 | Status: SHIPPED | OUTPATIENT
Start: 2019-10-29 | End: 2020-04-23

## 2019-10-29 NOTE — PROGRESS NOTES
Subjective:       Patient ID: Debbi Zhu is a 44 y.o. female.    Chief Complaint: Hospital Follow Up    Established patient follows up for management of chronic medical illnesses with complaints today. Please see dictation and ROS for interval problems, specific complaints and disease management discussion.    Patient referred for a preoperative clearance. No acute complaints today.     Specific complaints - see dictation and please see ROS.    P, S, Fm, Soc Hx's; Meds, allergies - reviewed and reconciled.    Health maintenance file reviewed and addressed items due.    HOSPFU    Chronic symptomatic anemia, rec'd 1 unit blood. Pending gyn and prob hys. Long term heavy menses (4 years).    No GI c/o and no fam his GI cancer. Stool occult neg x 1 in hosp.      Review of Systems   Constitutional: Positive for fatigue. Negative for chills, fever and unexpected weight change.   HENT: Negative for congestion and trouble swallowing.    Eyes: Negative for redness and visual disturbance.   Respiratory: Negative for cough, chest tightness and shortness of breath.    Cardiovascular: Positive for palpitations. Negative for chest pain and leg swelling.   Gastrointestinal: Negative for abdominal pain and blood in stool.   Genitourinary: Positive for menstrual problem and vaginal bleeding. Negative for difficulty urinating and hematuria.   Musculoskeletal: Negative for arthralgias, back pain, gait problem, joint swelling, myalgias and neck pain.   Skin: Negative for color change and rash.   Neurological: Negative for tremors, speech difficulty, weakness, numbness and headaches.   Hematological: Negative for adenopathy. Does not bruise/bleed easily.   Psychiatric/Behavioral: Negative for behavioral problems, confusion and sleep disturbance. The patient is not nervous/anxious.        Objective:      Physical Exam   Constitutional: She is oriented to person, place, and time. She appears well-developed and well-nourished.    HENT:   Head: Normocephalic and atraumatic.   Eyes: Conjunctivae are normal. No scleral icterus.   Neck: Normal range of motion. Neck supple.   Cardiovascular: Normal rate, normal heart sounds and intact distal pulses. Exam reveals no gallop and no friction rub.   No murmur heard.  Pulmonary/Chest: Effort normal and breath sounds normal. No respiratory distress. She has no wheezes. She has no rales.   Abdominal: She exhibits no distension, no abdominal bruit and no mass. There is no tenderness. There is no rebound and no guarding.   Musculoskeletal: She exhibits no edema or deformity.   Neurological: She is alert and oriented to person, place, and time. She displays no tremor. No cranial nerve deficit. Coordination and gait normal.   Skin: Skin is warm and dry. No rash noted. She is not diaphoretic. No erythema.   Psychiatric: She has a normal mood and affect. Her behavior is normal. Judgment and thought content normal.   Nursing note and vitals reviewed.      Assessment:       1. Symptomatic anemia    2. Hypertension, essential    3. Preoperative clearance    4. Colon cancer screening        Plan:     Medication List with Changes/Refills   New Medications    HYDROCHLOROTHIAZIDE (HYDRODIURIL) 12.5 MG TAB    Take 1 tablet (12.5 mg total) by mouth once daily.   Current Medications    AMLODIPINE (NORVASC) 10 MG TABLET    Take 1 tablet (10 mg total) by mouth once daily.    FERROUS SULFATE (FEOSOL) 325 MG (65 MG IRON) TAB TABLET    Take 1 tablet (325 mg total) by mouth 3 (three) times daily with meals.    ONDANSETRON (ZOFRAN-ODT) 4 MG TBDL    Take 1 tablet (4 mg total) by mouth every 6 (six) hours as needed.     Debbi was seen today for hospital follow up.    Diagnoses and all orders for this visit:    Symptomatic anemia  -     Occult blood x 1, stool; Future  -     Fecal Immunochemical Test (iFOBT); Future    Hypertension, essential    Preoperative clearance    Colon cancer screening  -     Occult blood x 1, stool;  Future  -     Fecal Immunochemical Test (iFOBT); Future    Other orders  -     hydroCHLOROthiazide (HYDRODIURIL) 12.5 MG Tab; Take 1 tablet (12.5 mg total) by mouth once daily.      recent labs and EKG reviewed. Add HCTZ and FU in 1 month.    See meds, orders, follow up and instructions sections of encounter. Clear for surgery and cc to referring physician.

## 2019-10-30 ENCOUNTER — PATIENT MESSAGE (OUTPATIENT)
Dept: OBSTETRICS AND GYNECOLOGY | Facility: CLINIC | Age: 44
End: 2019-10-30

## 2019-10-31 ENCOUNTER — TELEPHONE (OUTPATIENT)
Dept: OBSTETRICS AND GYNECOLOGY | Facility: CLINIC | Age: 44
End: 2019-10-31

## 2019-10-31 DIAGNOSIS — D25.9 UTERINE LEIOMYOMA, UNSPECIFIED LOCATION: Primary | ICD-10-CM

## 2019-10-31 NOTE — TELEPHONE ENCOUNTER
Patient called.  Discussed heavy bleeding. Recommended EMBX and ultrasound.  Patient agrees with plan.

## 2019-11-03 ENCOUNTER — HOSPITAL ENCOUNTER (OUTPATIENT)
Dept: RADIOLOGY | Facility: HOSPITAL | Age: 44
Discharge: HOME | End: 2019-11-03
Attending: FAMILY MEDICINE
Payer: COMMERCIAL

## 2019-11-03 DIAGNOSIS — Z12.31 ENCOUNTER FOR SCREENING MAMMOGRAM FOR MALIGNANT NEOPLASM OF BREAST: ICD-10-CM

## 2019-11-03 PROCEDURE — 77067 MAMMO DIGITAL SCREENING BILAT WITH TOMOSYNTHESIS_CAD: ICD-10-PCS | Mod: 26,,, | Performed by: RADIOLOGY

## 2019-11-03 PROCEDURE — 77063 MAMMO DIGITAL SCREENING BILAT WITH TOMOSYNTHESIS_CAD: ICD-10-PCS | Mod: 26,,, | Performed by: RADIOLOGY

## 2019-11-03 PROCEDURE — 77063 BREAST TOMOSYNTHESIS BI: CPT | Mod: 26,,, | Performed by: RADIOLOGY

## 2019-11-03 PROCEDURE — 77067 SCR MAMMO BI INCL CAD: CPT | Mod: 26,,, | Performed by: RADIOLOGY

## 2019-11-03 PROCEDURE — 77067 SCR MAMMO BI INCL CAD: CPT | Mod: TC

## 2019-11-11 ENCOUNTER — PATIENT OUTREACH (OUTPATIENT)
Dept: OTHER | Facility: OTHER | Age: 44
End: 2019-11-11

## 2019-11-11 DIAGNOSIS — I10 HYPERTENSION, ESSENTIAL: Primary | ICD-10-CM

## 2019-11-11 NOTE — PROGRESS NOTES
Digital Medicine: Clinician Introduction    Debbi Zhu is a 44 y.o. female who is newly enrolled in the Digital Medicine Clinic.    The following information was reviewed and updated:  Preferred pharmacy   [id number] - [place] - [address]  [address] [address]  Phone: [phone] Fax: [phone]        Review of patient's allergies indicates:  No Known Allergies    Patient     The history is provided by the patient.     HYPERTENSION  Our goal is to get BP to consistently below 130/80mmHg and make the process convenient so patient can avoid extra trips to the office. Getting your blood pressure below 130/80mmHg (definition of control) will reduce your risk for heart attack, kidney failure, stroke and death (as well as kidney failure, eye disease, & dementia)      Reviewed non-pharmacologic therapies and impact on BP      Explained that we expect patient to obtain several blood pressures per week at random times of day.  Instructed patient not to allow anyone else to use phone and monitoring device.  Confirmed appropriate BP monitoring technique.      Explained to patient that the digital medicine team is not available for emergencies.  [facility] ([phone] or [phone]) or 911 if needed.    Patient's BP goal is 130/80. Patients BP average is 140/87 mmHg, which is above goal, per 2017 ACC/AHA Hypertension Guidelines.  When asked what the patient thinks is causing BP to be elevated, they state: improving      Med Review complete.    Allergies reviewed.      Last 5 Patient Entered Readings                                      Current 30 Day Average: 140/87     Recent Readings 11/8/2019 11/4/2019 11/2/2019 11/1/2019 11/1/2019    SBP (mmHg) 119 127 133 118 120    DBP (mmHg) 84 81 88 65 87    Pulse 73 91 89 77 81            INTERVENTION(S)  recommended diet modifications, recommended physical activity, reviewed monitoring technique and  encouragement/support    PLAN  patient verbalizes understanding and continue monitoring    Blood pressures have trended down significantly since optimization of amlodipine and initiation of HCTZ. Readings are near goal.  - Pateint is tolerating medication with improved compliance.   - Patient is also working on lifestyle changes such as sodium reduction, better food choices, and smaller portions.     Given multiple recent medication changes and significant improvement near or at goal. Will continue monitoring at thsi time. Patient to continue working on lifestyle modifications.    Will CONTINUE amlodipine 10mg daily and HCTZ 12.5mg daily.     BMP entered for monitoring after initiation of HCTZ.     Will schedule next outreach in 6-8 weeks.         There are no preventive care reminders to display for this patient.    Current Medication Regimen:  Hypertension Medications             amLODIPine (NORVASC) 10 MG tablet Take 1 tablet (10 mg total) by mouth once daily.    hydroCHLOROthiazide (HYDRODIURIL) 12.5 MG Tab Take 1 tablet (12.5 mg total) by mouth once daily.            Reviewed the importance of self-monitoring, medication adherence, and that the health  can be used as a resource for lifestyle modifications to help reduce or maintain a healthy lifestyle.    Sent link to [facility] webpages and my contact information via [employer] for future questions. Follow up scheduled.                     Medication Adherence Screening   She misses doses: never

## 2019-11-13 ENCOUNTER — PATIENT OUTREACH (OUTPATIENT)
Dept: ADMINISTRATIVE | Facility: HOSPITAL | Age: 44
End: 2019-11-13

## 2019-11-18 ENCOUNTER — HOSPITAL ENCOUNTER (OUTPATIENT)
Dept: RADIOLOGY | Facility: HOSPITAL | Age: 44
Discharge: HOME | End: 2019-11-18
Attending: OBSTETRICS & GYNECOLOGY
Payer: COMMERCIAL

## 2019-11-18 DIAGNOSIS — D25.9 UTERINE LEIOMYOMA, UNSPECIFIED LOCATION: ICD-10-CM

## 2019-11-18 PROCEDURE — 76830 US PELVIS COMP WITH TRANSVAG NON-OB (XPD): ICD-10-PCS | Mod: 26,,, | Performed by: RADIOLOGY

## 2019-11-18 PROCEDURE — 76856 US PELVIS COMP WITH TRANSVAG NON-OB (XPD): ICD-10-PCS | Mod: 26,,, | Performed by: RADIOLOGY

## 2019-11-18 PROCEDURE — 76856 US EXAM PELVIC COMPLETE: CPT | Mod: 26,,, | Performed by: RADIOLOGY

## 2019-11-18 PROCEDURE — 76830 TRANSVAGINAL US NON-OB: CPT | Mod: TC

## 2019-11-18 PROCEDURE — 76830 TRANSVAGINAL US NON-OB: CPT | Mod: 26,,, | Performed by: RADIOLOGY

## 2019-11-19 ENCOUNTER — PATIENT OUTREACH (OUTPATIENT)
Dept: OTHER | Facility: OTHER | Age: 44
End: 2019-11-19

## 2019-11-19 ENCOUNTER — LAB VISIT (OUTPATIENT)
Dept: LAB | Facility: OTHER | Age: 44
End: 2019-11-19
Attending: OBSTETRICS & GYNECOLOGY
Payer: COMMERCIAL

## 2019-11-19 ENCOUNTER — PROCEDURE VISIT (OUTPATIENT)
Dept: OBSTETRICS AND GYNECOLOGY | Facility: CLINIC | Age: 44
End: 2019-11-19
Payer: COMMERCIAL

## 2019-11-19 VITALS
SYSTOLIC BLOOD PRESSURE: 126 MMHG | WEIGHT: 186.94 LBS | BODY MASS INDEX: 34.4 KG/M2 | HEIGHT: 62 IN | DIASTOLIC BLOOD PRESSURE: 74 MMHG

## 2019-11-19 DIAGNOSIS — N93.9 ABNORMAL UTERINE BLEEDING (AUB): Primary | ICD-10-CM

## 2019-11-19 DIAGNOSIS — Z12.4 SCREENING FOR CERVICAL CANCER: ICD-10-CM

## 2019-11-19 DIAGNOSIS — N93.9 ABNORMAL UTERINE BLEEDING (AUB): ICD-10-CM

## 2019-11-19 LAB
ABO + RH BLD: NORMAL
ANISOCYTOSIS BLD QL SMEAR: SLIGHT
B-HCG UR QL: NEGATIVE
BASOPHILS # BLD AUTO: 0.05 K/UL (ref 0–0.2)
BASOPHILS NFR BLD: 0.7 % (ref 0–1.9)
BLD GP AB SCN CELLS X3 SERPL QL: NORMAL
CTP QC/QA: YES
DIFFERENTIAL METHOD: ABNORMAL
EOSINOPHIL # BLD AUTO: 0.1 K/UL (ref 0–0.5)
EOSINOPHIL NFR BLD: 1.4 % (ref 0–8)
ERYTHROCYTE [DISTWIDTH] IN BLOOD BY AUTOMATED COUNT: ABNORMAL % (ref 11.5–14.5)
HCT VFR BLD AUTO: 36.4 % (ref 37–48.5)
HGB BLD-MCNC: 11 G/DL (ref 12–16)
HYPOCHROMIA BLD QL SMEAR: ABNORMAL
IMM GRANULOCYTES # BLD AUTO: 0.01 K/UL (ref 0–0.04)
IMM GRANULOCYTES NFR BLD AUTO: 0.1 % (ref 0–0.5)
LYMPHOCYTES # BLD AUTO: 2 K/UL (ref 1–4.8)
LYMPHOCYTES NFR BLD: 26.5 % (ref 18–48)
MCH RBC QN AUTO: 22.5 PG (ref 27–31)
MCHC RBC AUTO-ENTMCNC: 30.2 G/DL (ref 32–36)
MCV RBC AUTO: 74 FL (ref 82–98)
MONOCYTES # BLD AUTO: 0.5 K/UL (ref 0.3–1)
MONOCYTES NFR BLD: 7.1 % (ref 4–15)
NEUTROPHILS # BLD AUTO: 4.7 K/UL (ref 1.8–7.7)
NEUTROPHILS NFR BLD: 64.2 % (ref 38–73)
NRBC BLD-RTO: 0 /100 WBC
PLATELET # BLD AUTO: 451 K/UL (ref 150–350)
PLATELET BLD QL SMEAR: ABNORMAL
PMV BLD AUTO: ABNORMAL FL (ref 9.2–12.9)
POIKILOCYTOSIS BLD QL SMEAR: SLIGHT
RBC # BLD AUTO: 4.89 M/UL (ref 4–5.4)
WBC # BLD AUTO: 7.35 K/UL (ref 3.9–12.7)

## 2019-11-19 PROCEDURE — 88305 TISSUE EXAM BY PATHOLOGIST: CPT | Mod: 26,,, | Performed by: PATHOLOGY

## 2019-11-19 PROCEDURE — 88141 CYTOPATH C/V INTERPRET: CPT | Mod: ,,, | Performed by: PATHOLOGY

## 2019-11-19 PROCEDURE — 88305 TISSUE EXAM BY PATHOLOGIST: ICD-10-PCS | Mod: 26,,, | Performed by: PATHOLOGY

## 2019-11-19 PROCEDURE — 99213 OFFICE O/P EST LOW 20 MIN: CPT | Mod: 25,S$GLB,, | Performed by: OBSTETRICS & GYNECOLOGY

## 2019-11-19 PROCEDURE — 58100 BIOPSY OF UTERUS LINING: CPT | Mod: S$GLB,,, | Performed by: OBSTETRICS & GYNECOLOGY

## 2019-11-19 PROCEDURE — 81025 POCT URINE PREGNANCY: ICD-10-PCS | Mod: S$GLB,,, | Performed by: OBSTETRICS & GYNECOLOGY

## 2019-11-19 PROCEDURE — 88175 CYTOPATH C/V AUTO FLUID REDO: CPT | Performed by: PATHOLOGY

## 2019-11-19 PROCEDURE — 88305 TISSUE EXAM BY PATHOLOGIST: CPT | Performed by: PATHOLOGY

## 2019-11-19 PROCEDURE — 88141 PR  CYTOPATH CERV/VAG INTERPRET: ICD-10-PCS | Mod: ,,, | Performed by: PATHOLOGY

## 2019-11-19 PROCEDURE — 85025 COMPLETE CBC W/AUTO DIFF WBC: CPT

## 2019-11-19 PROCEDURE — 86901 BLOOD TYPING SEROLOGIC RH(D): CPT

## 2019-11-19 PROCEDURE — 81025 URINE PREGNANCY TEST: CPT | Mod: S$GLB,,, | Performed by: OBSTETRICS & GYNECOLOGY

## 2019-11-19 PROCEDURE — 99213 PR OFFICE/OUTPT VISIT, EST, LEVL III, 20-29 MIN: ICD-10-PCS | Mod: 25,S$GLB,, | Performed by: OBSTETRICS & GYNECOLOGY

## 2019-11-19 PROCEDURE — 58100 PR BIOPSY OF UTERUS LINING: ICD-10-PCS | Mod: S$GLB,,, | Performed by: OBSTETRICS & GYNECOLOGY

## 2019-11-19 PROCEDURE — 36415 COLL VENOUS BLD VENIPUNCTURE: CPT

## 2019-11-19 PROCEDURE — 87624 HPV HI-RISK TYP POOLED RSLT: CPT

## 2019-11-19 NOTE — PROCEDURES
Procedures     DATE: 11/19/2019     TIME: 2:38 PM     PROCEDURE: Endometrial biopsy    INDICATION: menorrhagia    PATIENT CONSENT: written    PRE ENDOMETRIAL BIOPSY COUNSELING:  The patient was informed of the risk of bleeding, infection, uterine perforation and pain and that the test will rule-out endometrial cancer with accuracy greater than 95%. She was counseled on the alternatives to endometrial biopsy.  All of her questions were answered.  Patient gives written consent    Patient reports that she is: s/p BTL    ANESTHESIA: None    ASSISTANT: Hema    Labs: UPT performed prior to the procedure was negative    PROCEDURE NOTE:  The cervix was visualized with a speculum and swabbed with Betadinex3.  The anterior lip of the cervix was grasped with a single toothed tenaculum, and a sterile endometrial pipelle was inserted into the uterus to a sound length of 9 cm. 1 passes were made with the pipelle and moderate amount of tissue was obtained. The specimen was placed in formalin and sent to Pathology for evaluation.     COMPLICATIONS: None    DISPOSITION: The patient tolerated the above procedure well.      POST ENDOMETRIAL BIOPSY COUNSELING:  - Manage post biopsy cramping with NSAIDs or Tylenol.  - Expect spotting or light bleeding for a few days.  - Report bleeding heavier than a period, fever > 101.0 F, worsening pain or a foul  smelling vaginal discharge.      Emmanuelle Davies MD 11/19/2019 2:38 PM

## 2019-11-19 NOTE — PROGRESS NOTES
Gynecology    SUBJECTIVE:     Chief Complaint: Endometrial Biopsy       History of Present Illness:  44 year old who returns for AUB.  Patient was hospitalized in October for elevated BP and anemia.  She received a transfusion for low h/h.  Cycles have been every 15 days for the past four years.  This has been going on for a few years.  Does not bleed between periods.  She had a uterine biopsy 2016 which was normal.  Has not been using OCPs or any medication to control bleeding.  Does have lightheadedness and dizziness due to heavy bleeding.  She even passed out this past Saturday.  She is not currently on her period.      Review of Systems:  Review of Systems   Constitutional: Positive for fatigue. Negative for appetite change, fever and unexpected weight change.   Respiratory: Negative for shortness of breath.    Cardiovascular: Negative for chest pain.   Gastrointestinal: Negative for nausea and vomiting.   Genitourinary: Positive for menorrhagia and menstrual problem. Negative for dysmenorrhea, pelvic pain, vaginal bleeding, vaginal discharge and vaginal pain.   Neurological: Positive for vertigo and syncope.        OBJECTIVE:     Physical Exam:  Physical Exam   Constitutional: She is oriented to person, place, and time. She appears well-developed and well-nourished.   Pulmonary/Chest: Effort normal.   Abdominal: Soft.   Genitourinary: Vagina normal. No labial fusion. There is no rash, tenderness, lesion or injury on the right labia. There is no rash, tenderness, lesion or injury on the left labia. No erythema, tenderness or bleeding in the vagina. No foreign body in the vagina. No signs of injury around the vagina. No vaginal discharge found.   Genitourinary Comments: Urethra: normal appearing urethra with no masses, tenderness or lesions  Urethral meatus: normal size, anterior vaginal wall with no prolapse, no lesions   Neurological: She is alert and oriented to person, place, and time.   Psychiatric: She has  a normal mood and affect. Her behavior is normal. Judgment and thought content normal.   Nursing note and vitals reviewed.      Chaperoned by: Hema    ASSESSMENT:       ICD-10-CM ICD-9-CM    1. Abnormal uterine bleeding (AUB) N93.9 626.9 POCT urine pregnancy      CBC auto differential      Type & Screen      Specimen to Pathology, Ob/Gyn   2. Screening for cervical cancer Z12.4 V76.2 Liquid-Based Pap Smear, Screening      HPV High Risk Genotypes, PCR          Plan:      Debbi was seen today for endometrial biopsy.    Diagnoses and all orders for this visit:    Abnormal uterine bleeding (AUB)  -     POCT urine pregnancy  -     CBC auto differential; Future  -     Type & Screen; Future  -     Specimen to Pathology, Ob/Gyn    Screening for cervical cancer  -     Liquid-Based Pap Smear, Screening  -     HPV High Risk Genotypes, PCR    - screen for cervical cancer  - reviewed all options for treatment of AUB- medical/surgical  - patient is interested in hysterectomy; reviewed risks of injury of bladder due to history of CS x 6; patient aware.  Desires hysterectomy prior to Jan 1.     Orders Placed This Encounter   Procedures    HPV High Risk Genotypes, PCR    CBC auto differential    POCT urine pregnancy    Type & Screen       No follow-ups on file.    Emmanuelle Davies

## 2019-11-21 ENCOUNTER — TELEPHONE (OUTPATIENT)
Dept: OBSTETRICS AND GYNECOLOGY | Facility: CLINIC | Age: 44
End: 2019-11-21

## 2019-11-21 NOTE — TELEPHONE ENCOUNTER
----- Message from Emmanuelle Davies MD sent at 11/20/2019  4:43 PM CST -----  Surgery- TL scheduled for 12/17.  Please schedule preop with me and with anesthesia. Thanks!  Please call the patient and notify her of the date.

## 2019-11-22 LAB
HPV HR 12 DNA SPEC QL NAA+PROBE: POSITIVE
HPV16 AG SPEC QL: NEGATIVE
HPV18 DNA SPEC QL NAA+PROBE: NEGATIVE

## 2019-11-24 LAB
FINAL PATHOLOGIC DIAGNOSIS: NORMAL
GROSS: NORMAL

## 2019-11-25 ENCOUNTER — TELEPHONE (OUTPATIENT)
Dept: OBSTETRICS AND GYNECOLOGY | Facility: CLINIC | Age: 44
End: 2019-11-25

## 2019-11-25 DIAGNOSIS — N93.9 ABNORMAL UTERINE BLEEDING (AUB): Primary | ICD-10-CM

## 2019-12-01 ENCOUNTER — PATIENT OUTREACH (OUTPATIENT)
Dept: ADMINISTRATIVE | Facility: OTHER | Age: 44
End: 2019-12-01

## 2019-12-03 LAB
FINAL PATHOLOGIC DIAGNOSIS: NORMAL
Lab: NORMAL

## 2019-12-04 ENCOUNTER — HOSPITAL ENCOUNTER (OUTPATIENT)
Dept: PREADMISSION TESTING | Facility: OTHER | Age: 44
Discharge: HOME | End: 2019-12-04
Attending: OBSTETRICS & GYNECOLOGY
Payer: COMMERCIAL

## 2019-12-04 ENCOUNTER — OFFICE VISIT (OUTPATIENT)
Dept: OBSTETRICS AND GYNECOLOGY | Facility: CLINIC | Age: 44
End: 2019-12-04
Payer: COMMERCIAL

## 2019-12-04 ENCOUNTER — ANESTHESIA EVENT (OUTPATIENT)
Dept: SURGERY | Facility: OTHER | Age: 44
End: 2019-12-04
Payer: COMMERCIAL

## 2019-12-04 VITALS
OXYGEN SATURATION: 100 % | BODY MASS INDEX: 33.68 KG/M2 | WEIGHT: 183 LBS | HEART RATE: 75 BPM | SYSTOLIC BLOOD PRESSURE: 135 MMHG | TEMPERATURE: 98 F | DIASTOLIC BLOOD PRESSURE: 87 MMHG | HEIGHT: 62 IN

## 2019-12-04 VITALS
WEIGHT: 184.75 LBS | HEIGHT: 62 IN | SYSTOLIC BLOOD PRESSURE: 112 MMHG | BODY MASS INDEX: 34 KG/M2 | DIASTOLIC BLOOD PRESSURE: 74 MMHG

## 2019-12-04 DIAGNOSIS — Z01.818 PREOP TESTING: Primary | ICD-10-CM

## 2019-12-04 DIAGNOSIS — N93.9 ABNORMAL UTERINE BLEEDING (AUB): ICD-10-CM

## 2019-12-04 DIAGNOSIS — Z01.818 PRE-OP EXAM: Primary | ICD-10-CM

## 2019-12-04 LAB
ABO + RH BLD: NORMAL
ANION GAP SERPL CALC-SCNC: 11 MMOL/L (ref 8–16)
ANISOCYTOSIS BLD QL SMEAR: SLIGHT
BASOPHILS # BLD AUTO: 0.05 K/UL (ref 0–0.2)
BASOPHILS NFR BLD: 0.8 % (ref 0–1.9)
BLD GP AB SCN CELLS X3 SERPL QL: NORMAL
BUN SERPL-MCNC: 10 MG/DL (ref 6–20)
CALCIUM SERPL-MCNC: 9.1 MG/DL (ref 8.7–10.5)
CHLORIDE SERPL-SCNC: 101 MMOL/L (ref 95–110)
CO2 SERPL-SCNC: 30 MMOL/L (ref 23–29)
CREAT SERPL-MCNC: 0.8 MG/DL (ref 0.5–1.4)
DIFFERENTIAL METHOD: ABNORMAL
EOSINOPHIL # BLD AUTO: 0.1 K/UL (ref 0–0.5)
EOSINOPHIL NFR BLD: 1.1 % (ref 0–8)
ERYTHROCYTE [DISTWIDTH] IN BLOOD BY AUTOMATED COUNT: ABNORMAL % (ref 11.5–14.5)
EST. GFR  (AFRICAN AMERICAN): >60 ML/MIN/1.73 M^2
EST. GFR  (NON AFRICAN AMERICAN): >60 ML/MIN/1.73 M^2
GLUCOSE SERPL-MCNC: 90 MG/DL (ref 70–110)
HCT VFR BLD AUTO: 34.6 % (ref 37–48.5)
HGB BLD-MCNC: 10.6 G/DL (ref 12–16)
HYPOCHROMIA BLD QL SMEAR: ABNORMAL
IMM GRANULOCYTES # BLD AUTO: 0.02 K/UL (ref 0–0.04)
IMM GRANULOCYTES NFR BLD AUTO: 0.3 % (ref 0–0.5)
LYMPHOCYTES # BLD AUTO: 1.9 K/UL (ref 1–4.8)
LYMPHOCYTES NFR BLD: 29.3 % (ref 18–48)
MCH RBC QN AUTO: 24 PG (ref 27–31)
MCHC RBC AUTO-ENTMCNC: 30.6 G/DL (ref 32–36)
MCV RBC AUTO: 79 FL (ref 82–98)
MONOCYTES # BLD AUTO: 0.4 K/UL (ref 0.3–1)
MONOCYTES NFR BLD: 6.9 % (ref 4–15)
NEUTROPHILS # BLD AUTO: 4 K/UL (ref 1.8–7.7)
NEUTROPHILS NFR BLD: 61.6 % (ref 38–73)
NRBC BLD-RTO: 0 /100 WBC
OVALOCYTES BLD QL SMEAR: ABNORMAL
PLATELET # BLD AUTO: 343 K/UL (ref 150–350)
PLATELET BLD QL SMEAR: ABNORMAL
PMV BLD AUTO: 9.5 FL (ref 9.2–12.9)
POIKILOCYTOSIS BLD QL SMEAR: SLIGHT
POTASSIUM SERPL-SCNC: 2.9 MMOL/L (ref 3.5–5.1)
RBC # BLD AUTO: 4.41 M/UL (ref 4–5.4)
SODIUM SERPL-SCNC: 142 MMOL/L (ref 136–145)
WBC # BLD AUTO: 6.42 K/UL (ref 3.9–12.7)

## 2019-12-04 PROCEDURE — 99499 NO LOS: ICD-10-PCS | Mod: S$GLB,,, | Performed by: OBSTETRICS & GYNECOLOGY

## 2019-12-04 PROCEDURE — 36415 COLL VENOUS BLD VENIPUNCTURE: CPT

## 2019-12-04 PROCEDURE — 99999 PR PBB SHADOW E&M-EST. PATIENT-LVL III: ICD-10-PCS | Mod: PBBFAC,,, | Performed by: OBSTETRICS & GYNECOLOGY

## 2019-12-04 PROCEDURE — 86850 RBC ANTIBODY SCREEN: CPT

## 2019-12-04 PROCEDURE — 99499 UNLISTED E&M SERVICE: CPT | Mod: S$GLB,,, | Performed by: OBSTETRICS & GYNECOLOGY

## 2019-12-04 PROCEDURE — 85025 COMPLETE CBC W/AUTO DIFF WBC: CPT

## 2019-12-04 PROCEDURE — 99999 PR PBB SHADOW E&M-EST. PATIENT-LVL III: CPT | Mod: PBBFAC,,, | Performed by: OBSTETRICS & GYNECOLOGY

## 2019-12-04 PROCEDURE — 80048 BASIC METABOLIC PNL TOTAL CA: CPT

## 2019-12-04 RX ORDER — LIDOCAINE HYDROCHLORIDE 10 MG/ML
0.5 INJECTION, SOLUTION EPIDURAL; INFILTRATION; INTRACAUDAL; PERINEURAL ONCE
Status: CANCELLED | OUTPATIENT
Start: 2019-12-04 | End: 2019-12-04

## 2019-12-04 RX ORDER — PREGABALIN 75 MG/1
75 CAPSULE ORAL
Status: CANCELLED | OUTPATIENT
Start: 2019-12-04 | End: 2019-12-04

## 2019-12-04 RX ORDER — CELECOXIB 200 MG/1
400 CAPSULE ORAL
Status: CANCELLED | OUTPATIENT
Start: 2019-12-04 | End: 2019-12-04

## 2019-12-04 RX ORDER — FAMOTIDINE 20 MG/1
20 TABLET, FILM COATED ORAL
Status: CANCELLED | OUTPATIENT
Start: 2019-12-04 | End: 2019-12-04

## 2019-12-04 RX ORDER — ACETAMINOPHEN 500 MG
1000 TABLET ORAL
Status: CANCELLED | OUTPATIENT
Start: 2019-12-04 | End: 2019-12-04

## 2019-12-04 RX ORDER — SODIUM CHLORIDE, SODIUM LACTATE, POTASSIUM CHLORIDE, CALCIUM CHLORIDE 600; 310; 30; 20 MG/100ML; MG/100ML; MG/100ML; MG/100ML
INJECTION, SOLUTION INTRAVENOUS CONTINUOUS
Status: CANCELLED | OUTPATIENT
Start: 2019-12-04

## 2019-12-04 NOTE — DISCHARGE INSTRUCTIONS
PRE-ADMIT TESTING -  [phone]    [address]  [place] [facility]          Your surgery has been scheduled at [facility]. We are pleased to have the opportunity to serve you. For Further Information please call [phone].    On the day of surgery please report to the Information Desk on the 1st floor.    · CONTACT YOUR PHYSICIAN'S OFFICE THE DAY PRIOR TO YOUR SURGERY TO OBTAIN YOUR ARRIVAL TIME.     · The evening before surgery do not eat anything after 9 p.m. ( this includes hard candy, chewing gum and mints).  You may only have GATORADE, POWERADE AND WATER  from 9 p.m. until you leave your home.   DO NOT DRINK ANY LIQUIDS ON THE WAY TO THE HOSPITAL.      SPECIAL MEDICATION INSTRUCTIONS: TAKE medications checked off by the Anesthesiologist on your Medication List.    Angiogram Patients: Take medications as instructed by your physician, including aspirin.     Surgery Patients:    If you take ASPIRIN - Your PHYSICIAN/SURGEON will need to inform you IF/OR when you need to stop taking aspirin prior to your surgery.     Do Not take any medications containing IBUPROFEN.  Do Not Wear any make-up or dark nail polish   (especially eye make-up) to surgery. If you come to surgery with makeup on you will be required to remove the makeup or nail polish.    Do not shave your surgical area at least 5 days prior to your surgery. The surgical prep will be performed at the hospital according to Infection Control regulations.    Leave all valuables at home.   Do Not wear any jewelry or watches, including any metal in body piercings. Jewelry must be removed prior to coming to the hospital.  There is a possibility that rings that are unable to be removed may be cut off if they are on the surgical extremity.    Contact Lens must be removed before surgery. Either do not wear the contact lens or bring a case and solution for storage.  Please bring a container for eyeglasses or dentures as required.  Bring  any paperwork your physician has provided, such as consent forms,  history and physicals, doctor's orders, etc.   Bring comfortable clothes that are loose fitting to wear upon discharge. Take into consideration the type of surgery being performed.  Maintain your diet as advised per your physician the day prior to surgery.      Adequate rest the night before surgery is advised.   Park in the Parking lot behind the hospital or in the [facility] across the street from the parking lot. Parking is complimentary.  If you will be discharged the same day as your procedure, please arrange for a responsible adult to drive you home or to accompany you if traveling by taxi.   YOU WILL NOT BE PERMITTED TO DRIVE OR TO LEAVE THE HOSPITAL ALONE AFTER SURGERY.   It is strongly recommended that you arrange for someone to remain with you for the first 24 hrs following your surgery.    The Surgeon will speak to your family/visitor after your surgery regarding the outcome of your surgery and post op care.  The Surgeon may speak to you after your surgery, but there is a possibility you may not remember the details.  Please check with your family members regarding the conversation with the Surgeon.    We strongly recommend whoever is bringing you home be present for discharge instructions.  This will ensure a thorough understanding for your post op home care.    EACH PATIENT IS ALLOWED TWO FAMILY MEMBERS OR VISITORS IN THE ROOM AND IN THE WAITING ROOMS WHILE YOU ARE IN SURGERY. ALL CHILDREN MUST ALWAYS BE ACCOMPANIED BY AN ADULT.    Thank you for your cooperation.  The Staff of [facility].                Bathing Instructions with Hibiclens     Shower the evening before and morning of your procedure with Hibiclens:   Wash your face with water and your regular face wash/soap   Apply Hibiclens directly on your skin or on a wet washcloth and wash gently. When showering: Move away from the shower stream when  applying Hibiclens to avoid rinsing off too soon.   Rinse thoroughly with warm water   Do not dilute Hibiclens         Dry off as usual, do not use any deodorant, powder, body lotions, perfume, after shave or cologne.

## 2019-12-04 NOTE — H&P (VIEW-ONLY)
Gynecology    SUBJECTIVE:   Chief Complaint: Pre-op Exam       History of Present Illness:  Ms. Zhu presents to sign consents for her scheduled TLH/BSO.      Previous HPI:   44 year old who returns for AUB.  Patient was hospitalized in October for elevated BP and anemia.  She received a transfusion for low h/h.  Cycles have been every 15 days for the past four years.  This has been going on for a few years.  Does not bleed between periods.  She had a uterine biopsy  which was normal.  Has not been using OCPs or any medication to control bleeding.  Does have lightheadedness and dizziness due to heavy bleeding.  She even passed out this past Saturday.  She is not currently on her period.      Imaging: FINDINGS:  Uterus:  Size: 9.8 x 5.1 x 5.4 cm  Masses: An intramural uterine fibroid measuring 1.3 x 1.6 x 1.5 cm is seen.  Endometrium: Normal in this pre menopausal patient, measuring 6 mm.  Multiple nabothian cysts are visualized in the cervix.  Right ovary:  Size: 2.1 x 2.6 x 2.2 cm  Appearance: Normal  Vascular flow: Normal.  Left ovary:  Size: 1.4 x 2.0 x 1.5 cm  Appearance: Normal  Vascular Flow: Normal.  Free Fluid:  None.    Pathology: 2019- benign uterine biopsy    Previous pap smear: NILM, positive HPV    Review of patient's allergies indicates:  No Known Allergies    Past Medical History:   Diagnosis Date    Abnormal Pap smear of cervix     laser ablation    Anemia     Fibroids     Hypertension      Past Surgical History:   Procedure Laterality Date     SECTION      6    DILATION AND CURETTAGE OF UTERUS      LASER ABLATION OF THE CERVIX      Secondary to abnormal pap    TUBAL LIGATION       OB History        8    Para   6    Term   5       1    AB   2    Living   6       SAB   2    TAB        Ectopic        Multiple   0    Live Births   6               Family History   Problem Relation Age of Onset    Hypertension Father     Breast cancer Neg Hx     Colon  cancer Neg Hx     Ovarian cancer Neg Hx      Social History     Tobacco Use    Smoking status: Never Smoker   Substance Use Topics    Alcohol use: Yes     Frequency: Monthly or less     Drinks per session: 1 or 2     Binge frequency: Never     Comment: maybe 1-2 times per year    Drug use: No       Current Outpatient Medications   Medication Sig    amLODIPine (NORVASC) 10 MG tablet Take 1 tablet (10 mg total) by mouth once daily.    ferrous sulfate (FEOSOL) 325 mg (65 mg iron) Tab tablet Take 1 tablet (325 mg total) by mouth 3 (three) times daily with meals.    hydroCHLOROthiazide (HYDRODIURIL) 12.5 MG Tab Take 1 tablet (12.5 mg total) by mouth once daily.     No current facility-administered medications for this visit.        Review of Systems:  Review of Systems   Genitourinary: Positive for menorrhagia and menstrual problem. Negative for pelvic pain, vaginal bleeding, vaginal discharge, vaginal pain and vaginal odor.        OBJECTIVE:   Physical Exam:  Physical Exam   Constitutional: She is oriented to person, place, and time. She appears well-developed and well-nourished.   Pulmonary/Chest: Effort normal.   Neurological: She is oriented to person, place, and time.   Skin: No pallor.   Psychiatric: She has a normal mood and affect. Her behavior is normal. Judgment and thought content normal.   Nursing note and vitals reviewed.        ASSESSMENT:       ICD-10-CM ICD-9-CM    1. Pre-op exam Z01.818 V72.84    2. Abnormal uterine bleeding (AUB) N93.9 626.9           Plan:      Debbi was seen today for pre-op exam.    Diagnoses and all orders for this visit:    Pre-op exam    Abnormal uterine bleeding (AUB)        No orders of the defined types were placed in this encounter.      - Work up today for TLH/BSO on 12/17  - Preadmit scheduled for today after this appointment  - CBC, CMP, Type and Screen   - Consents signed for procedure, risks and benefits reviewed.    - Counseling: discussed risks of surgery  including but not limited to: pain, infection, bleeding, injury to neighboring organs such as bowel, bladder, nerves, blood vessels, need for intraop consultation, and other risks of anesthesia/surgery- ICU admission, thromboembolism, heart attack, death, etc.  - discuss risks of removal vs maintenance of ovaries.  Patient desires removal.    No follow-ups on file.    Emmanuelle Davies

## 2019-12-04 NOTE — ANESTHESIA PREPROCEDURE EVALUATION
12/04/2019  Debbi Zhu is a 44 y.o., female.    Anesthesia Evaluation    I have reviewed the Patient Summary Reports.    I have reviewed the Nursing Notes.   I have reviewed the Medications.     Review of Systems  Anesthesia Hx:  No problems with previous Anesthesia    Social:  Social Alcohol Use, Non-Smoker    Hematology/Oncology:     Oncology Normal    -- Anemia:   EENT/Dental:EENT/Dental Normal   Cardiovascular:   Exercise tolerance: good Hypertension, well controlled    Pulmonary:  Pulmonary Normal    Renal/:  Renal/ Normal     Hepatic/GI:  Hepatic/GI Normal    Neurological:  Neurology Normal    Endocrine:  Endocrine Normal    Dermatological:  Skin Normal    Psych:  Psychiatric Normal           Physical Exam  General:  Obesity    Airway/Jaw/Neck:  Airway Findings: Mouth Opening: Normal Tongue: Normal  General Airway Assessment: Adult, Good  Mallampati: I  TM Distance: Normal, at least 6 cm  Jaw/Neck Findings:  Neck ROM: Normal ROM      Dental:  Dental Findings: In tact        Mental Status:  Mental Status Findings:  Cooperative, Alert and Oriented         Anesthesia Plan  Type of Anesthesia, risks & benefits discussed:  Anesthesia Type:  general  Patient's Preference:   Intra-op Monitoring Plan: standard ASA monitors  Intra-op Monitoring Plan Comments:   Post Op Pain Control Plan: per primary service following discharge from PACU and multimodal analgesia  Post Op Pain Control Plan Comments:   Induction:    Beta Blocker:         Informed Consent: Patient understands risks and agrees with Anesthesia plan.  Questions answered. Anesthesia consent signed with patient.  ASA Score: 2     Day of Surgery Review of History & Physical:    H&P update referred to the surgeon.     Anesthesia Plan Notes: CBC, BMP, and T&S.    Day of surgery update: K 3.3, Hb 10.6            Ready For Surgery From  Anesthesia Perspective.

## 2019-12-04 NOTE — PRE ADMISSION SCREENING
Received call from Jacinta in the blood bank stating the type and screen can not be done because patient was transfused on 10/15/2019. Must redraw type and screen morning of surgery.Will place orders for day of surgery.

## 2019-12-05 ENCOUNTER — PATIENT MESSAGE (OUTPATIENT)
Dept: INTERNAL MEDICINE | Facility: CLINIC | Age: 44
End: 2019-12-05

## 2019-12-05 RX ORDER — POTASSIUM CHLORIDE 750 MG/1
20 CAPSULE, EXTENDED RELEASE ORAL DAILY
Qty: 60 CAPSULE | Refills: 0 | Status: SHIPPED | OUTPATIENT
Start: 2019-12-05 | End: 2019-12-30

## 2019-12-05 NOTE — PRE ADMISSION SCREENING
Labs reviewed per Dr. Brooks.  Patient's PCP, Dr. Eubanks notified and asked to address with patient as soon as possible.

## 2019-12-05 NOTE — TELEPHONE ENCOUNTER
Requested medication refilled and sent by electronic prescription. Please call and notify the patient. Thank you.    Please advised patient that I will call in some potassium supplement for the next month.  She should schedule follow-up in 2 or 3 weeks so that we can recheck everything.  Thank you

## 2019-12-12 ENCOUNTER — PATIENT OUTREACH (OUTPATIENT)
Dept: ADMINISTRATIVE | Facility: HOSPITAL | Age: 44
End: 2019-12-12

## 2019-12-12 NOTE — PROGRESS NOTES
Received high blood pressure alert. Repeat reading appropriate and baseline for patient. Will continue to monitor with current plan.

## 2019-12-17 ENCOUNTER — HOSPITAL ENCOUNTER (OUTPATIENT)
Facility: OTHER | Age: 44
Discharge: HOME | End: 2019-12-17
Attending: OBSTETRICS & GYNECOLOGY | Admitting: OBSTETRICS & GYNECOLOGY
Payer: COMMERCIAL

## 2019-12-17 ENCOUNTER — ANESTHESIA (OUTPATIENT)
Dept: SURGERY | Facility: OTHER | Age: 44
End: 2019-12-17
Payer: COMMERCIAL

## 2019-12-17 ENCOUNTER — TELEPHONE (OUTPATIENT)
Dept: OBSTETRICS AND GYNECOLOGY | Facility: CLINIC | Age: 44
End: 2019-12-17

## 2019-12-17 VITALS
DIASTOLIC BLOOD PRESSURE: 76 MMHG | HEART RATE: 87 BPM | BODY MASS INDEX: 34 KG/M2 | TEMPERATURE: 99 F | WEIGHT: 184.75 LBS | SYSTOLIC BLOOD PRESSURE: 134 MMHG | HEIGHT: 62 IN | RESPIRATION RATE: 18 BRPM | OXYGEN SATURATION: 95 %

## 2019-12-17 DIAGNOSIS — Z90.710 S/P TOTAL HYSTERECTOMY AND BSO (BILATERAL SALPINGO-OOPHORECTOMY): ICD-10-CM

## 2019-12-17 DIAGNOSIS — S37.20XD INJURY OF BLADDER, SUBSEQUENT ENCOUNTER: Primary | ICD-10-CM

## 2019-12-17 DIAGNOSIS — E87.6 HYPOKALEMIA: ICD-10-CM

## 2019-12-17 DIAGNOSIS — Z90.79 S/P TOTAL HYSTERECTOMY AND BSO (BILATERAL SALPINGO-OOPHORECTOMY): ICD-10-CM

## 2019-12-17 DIAGNOSIS — N93.9 ABNORMAL UTERINE BLEEDING (AUB): ICD-10-CM

## 2019-12-17 DIAGNOSIS — Z90.722 S/P TOTAL HYSTERECTOMY AND BSO (BILATERAL SALPINGO-OOPHORECTOMY): ICD-10-CM

## 2019-12-17 DIAGNOSIS — N93.9 ABNORMAL UTERINE BLEEDING: Primary | ICD-10-CM

## 2019-12-17 LAB
ABO + RH BLD: NORMAL
B-HCG UR QL: NEGATIVE
BLD GP AB SCN CELLS X3 SERPL QL: NORMAL
CTP QC/QA: YES
POCT GLUCOSE: 81 MG/DL (ref 70–110)
POTASSIUM SERPL-SCNC: 3.3 MMOL/L (ref 3.5–5.1)

## 2019-12-17 PROCEDURE — 25000003 PHARM REV CODE 250: Performed by: SPECIALIST

## 2019-12-17 PROCEDURE — 71000039 HC RECOVERY, EACH ADD'L HOUR: Performed by: OBSTETRICS & GYNECOLOGY

## 2019-12-17 PROCEDURE — 25000003 PHARM REV CODE 250: Performed by: NURSE ANESTHETIST, CERTIFIED REGISTERED

## 2019-12-17 PROCEDURE — 25000242 PHARM REV CODE 250 ALT 637 W/ HCPCS: Performed by: NURSE ANESTHETIST, CERTIFIED REGISTERED

## 2019-12-17 PROCEDURE — 63600175 PHARM REV CODE 636 W HCPCS: Performed by: SPECIALIST

## 2019-12-17 PROCEDURE — 27201423 OPTIME MED/SURG SUP & DEVICES STERILE SUPPLY: Performed by: OBSTETRICS & GYNECOLOGY

## 2019-12-17 PROCEDURE — 71000015 HC POSTOP RECOV 1ST HR: Performed by: OBSTETRICS & GYNECOLOGY

## 2019-12-17 PROCEDURE — 82962 GLUCOSE BLOOD TEST: CPT | Performed by: OBSTETRICS & GYNECOLOGY

## 2019-12-17 PROCEDURE — 63600175 PHARM REV CODE 636 W HCPCS: Performed by: NURSE ANESTHETIST, CERTIFIED REGISTERED

## 2019-12-17 PROCEDURE — 25000003 PHARM REV CODE 250: Performed by: ANESTHESIOLOGY

## 2019-12-17 PROCEDURE — 63600175 PHARM REV CODE 636 W HCPCS: Performed by: OBSTETRICS & GYNECOLOGY

## 2019-12-17 PROCEDURE — 71000033 HC RECOVERY, INTIAL HOUR: Performed by: OBSTETRICS & GYNECOLOGY

## 2019-12-17 PROCEDURE — 58571 TLH W/T/O 250 G OR LESS: CPT | Mod: 82,,, | Performed by: OBSTETRICS & GYNECOLOGY

## 2019-12-17 PROCEDURE — 88307 PR  SURG PATH,LEVEL V: ICD-10-PCS | Mod: 26,,, | Performed by: PATHOLOGY

## 2019-12-17 PROCEDURE — 63600175 PHARM REV CODE 636 W HCPCS: Performed by: ANESTHESIOLOGY

## 2019-12-17 PROCEDURE — 37000009 HC ANESTHESIA EA ADD 15 MINS: Performed by: OBSTETRICS & GYNECOLOGY

## 2019-12-17 PROCEDURE — 81025 URINE PREGNANCY TEST: CPT | Performed by: SPECIALIST

## 2019-12-17 PROCEDURE — 58571 PR LAPAROSCOPY W TOT HYSTERECTUTERUS <=250 GRAM  W TUBE/OVARY: ICD-10-PCS | Mod: 82,,, | Performed by: OBSTETRICS & GYNECOLOGY

## 2019-12-17 PROCEDURE — 71000016 HC POSTOP RECOV ADDL HR: Performed by: OBSTETRICS & GYNECOLOGY

## 2019-12-17 PROCEDURE — 36000711: Performed by: OBSTETRICS & GYNECOLOGY

## 2019-12-17 PROCEDURE — 36000710: Performed by: OBSTETRICS & GYNECOLOGY

## 2019-12-17 PROCEDURE — 58571 PR LAPAROSCOPY W TOT HYSTERECTUTERUS <=250 GRAM  W TUBE/OVARY: ICD-10-PCS | Mod: ,,, | Performed by: OBSTETRICS & GYNECOLOGY

## 2019-12-17 PROCEDURE — 58571 TLH W/T/O 250 G OR LESS: CPT | Mod: ,,, | Performed by: OBSTETRICS & GYNECOLOGY

## 2019-12-17 PROCEDURE — 84132 ASSAY OF SERUM POTASSIUM: CPT

## 2019-12-17 PROCEDURE — 88307 TISSUE EXAM BY PATHOLOGIST: CPT | Mod: 26,,, | Performed by: PATHOLOGY

## 2019-12-17 PROCEDURE — 86901 BLOOD TYPING SEROLOGIC RH(D): CPT

## 2019-12-17 PROCEDURE — 37000008 HC ANESTHESIA 1ST 15 MINUTES: Performed by: OBSTETRICS & GYNECOLOGY

## 2019-12-17 PROCEDURE — 88307 TISSUE EXAM BY PATHOLOGIST: CPT | Performed by: PATHOLOGY

## 2019-12-17 RX ORDER — IBUPROFEN 600 MG/1
600 TABLET ORAL EVERY 6 HOURS PRN
Status: DISCONTINUED | OUTPATIENT
Start: 2019-12-17 | End: 2019-12-17 | Stop reason: HOSPADM

## 2019-12-17 RX ORDER — IBUPROFEN 800 MG/1
800 TABLET ORAL EVERY 8 HOURS PRN
Qty: 60 TABLET | Refills: 0 | Status: SHIPPED | OUTPATIENT
Start: 2019-12-17 | End: 2021-03-01

## 2019-12-17 RX ORDER — FAMOTIDINE 20 MG/1
20 TABLET, FILM COATED ORAL
Status: COMPLETED | OUTPATIENT
Start: 2019-12-17 | End: 2019-12-17

## 2019-12-17 RX ORDER — SODIUM CHLORIDE 0.9 % (FLUSH) 0.9 %
3 SYRINGE (ML) INJECTION
Status: DISCONTINUED | OUTPATIENT
Start: 2019-12-17 | End: 2019-12-17 | Stop reason: HOSPADM

## 2019-12-17 RX ORDER — CEFAZOLIN SODIUM 1 G/3ML
2 INJECTION, POWDER, FOR SOLUTION INTRAMUSCULAR; INTRAVENOUS
Status: COMPLETED | OUTPATIENT
Start: 2019-12-17 | End: 2019-12-17

## 2019-12-17 RX ORDER — OXYCODONE HYDROCHLORIDE 5 MG/1
5 TABLET ORAL
Status: DISCONTINUED | OUTPATIENT
Start: 2019-12-17 | End: 2019-12-17 | Stop reason: HOSPADM

## 2019-12-17 RX ORDER — PROPOFOL 10 MG/ML
VIAL (ML) INTRAVENOUS
Status: DISCONTINUED | OUTPATIENT
Start: 2019-12-17 | End: 2019-12-17

## 2019-12-17 RX ORDER — LIDOCAINE HYDROCHLORIDE 10 MG/ML
0.5 INJECTION, SOLUTION EPIDURAL; INFILTRATION; INTRACAUDAL; PERINEURAL ONCE
Status: DISCONTINUED | OUTPATIENT
Start: 2019-12-17 | End: 2019-12-17 | Stop reason: HOSPADM

## 2019-12-17 RX ORDER — ACETAMINOPHEN 500 MG
1000 TABLET ORAL
Status: COMPLETED | OUTPATIENT
Start: 2019-12-17 | End: 2019-12-17

## 2019-12-17 RX ORDER — HYDROCODONE BITARTRATE AND ACETAMINOPHEN 5; 325 MG/1; MG/1
1 TABLET ORAL EVERY 4 HOURS PRN
Qty: 21 TABLET | Refills: 0 | Status: SHIPPED | OUTPATIENT
Start: 2019-12-17 | End: 2021-03-01

## 2019-12-17 RX ORDER — MIDAZOLAM HYDROCHLORIDE 1 MG/ML
INJECTION INTRAMUSCULAR; INTRAVENOUS
Status: DISCONTINUED | OUTPATIENT
Start: 2019-12-17 | End: 2019-12-17

## 2019-12-17 RX ORDER — SODIUM CHLORIDE, SODIUM LACTATE, POTASSIUM CHLORIDE, CALCIUM CHLORIDE 600; 310; 30; 20 MG/100ML; MG/100ML; MG/100ML; MG/100ML
INJECTION, SOLUTION INTRAVENOUS CONTINUOUS
Status: DISCONTINUED | OUTPATIENT
Start: 2019-12-17 | End: 2019-12-17 | Stop reason: HOSPADM

## 2019-12-17 RX ORDER — ALBUTEROL SULFATE 90 UG/1
AEROSOL, METERED RESPIRATORY (INHALATION)
Status: DISCONTINUED | OUTPATIENT
Start: 2019-12-17 | End: 2019-12-17

## 2019-12-17 RX ORDER — ONDANSETRON 2 MG/ML
INJECTION INTRAMUSCULAR; INTRAVENOUS
Status: DISCONTINUED | OUTPATIENT
Start: 2019-12-17 | End: 2019-12-17

## 2019-12-17 RX ORDER — HYDROMORPHONE HYDROCHLORIDE 2 MG/ML
0.4 INJECTION, SOLUTION INTRAMUSCULAR; INTRAVENOUS; SUBCUTANEOUS EVERY 5 MIN PRN
Status: DISCONTINUED | OUTPATIENT
Start: 2019-12-17 | End: 2019-12-17 | Stop reason: HOSPADM

## 2019-12-17 RX ORDER — NEOSTIGMINE METHYLSULFATE 1 MG/ML
INJECTION, SOLUTION INTRAVENOUS
Status: DISCONTINUED | OUTPATIENT
Start: 2019-12-17 | End: 2019-12-17

## 2019-12-17 RX ORDER — LIDOCAINE HCL/PF 100 MG/5ML
SYRINGE (ML) INTRAVENOUS
Status: DISCONTINUED | OUTPATIENT
Start: 2019-12-17 | End: 2019-12-17

## 2019-12-17 RX ORDER — DIPHENHYDRAMINE HYDROCHLORIDE 50 MG/ML
INJECTION INTRAMUSCULAR; INTRAVENOUS
Status: DISCONTINUED | OUTPATIENT
Start: 2019-12-17 | End: 2019-12-17

## 2019-12-17 RX ORDER — PHENYLEPHRINE HYDROCHLORIDE 10 MG/ML
INJECTION INTRAVENOUS
Status: DISCONTINUED | OUTPATIENT
Start: 2019-12-17 | End: 2019-12-17

## 2019-12-17 RX ORDER — DEXAMETHASONE SODIUM PHOSPHATE 4 MG/ML
INJECTION, SOLUTION INTRA-ARTICULAR; INTRALESIONAL; INTRAMUSCULAR; INTRAVENOUS; SOFT TISSUE
Status: DISCONTINUED | OUTPATIENT
Start: 2019-12-17 | End: 2019-12-17

## 2019-12-17 RX ORDER — OXYBUTYNIN CHLORIDE 5 MG/1
5 TABLET ORAL 3 TIMES DAILY PRN
Qty: 60 TABLET | Refills: 0 | Status: SHIPPED | OUTPATIENT
Start: 2019-12-17 | End: 2019-12-30 | Stop reason: SDUPTHER

## 2019-12-17 RX ORDER — CELECOXIB 200 MG/1
400 CAPSULE ORAL
Status: COMPLETED | OUTPATIENT
Start: 2019-12-17 | End: 2019-12-17

## 2019-12-17 RX ORDER — GLYCOPYRROLATE 0.2 MG/ML
INJECTION INTRAMUSCULAR; INTRAVENOUS
Status: DISCONTINUED | OUTPATIENT
Start: 2019-12-17 | End: 2019-12-17

## 2019-12-17 RX ORDER — PREGABALIN 75 MG/1
75 CAPSULE ORAL
Status: COMPLETED | OUTPATIENT
Start: 2019-12-17 | End: 2019-12-17

## 2019-12-17 RX ORDER — MEPERIDINE HYDROCHLORIDE 25 MG/ML
12.5 INJECTION INTRAMUSCULAR; INTRAVENOUS; SUBCUTANEOUS ONCE AS NEEDED
Status: DISCONTINUED | OUTPATIENT
Start: 2019-12-17 | End: 2019-12-17 | Stop reason: HOSPADM

## 2019-12-17 RX ORDER — ROCURONIUM BROMIDE 10 MG/ML
INJECTION, SOLUTION INTRAVENOUS
Status: DISCONTINUED | OUTPATIENT
Start: 2019-12-17 | End: 2019-12-17

## 2019-12-17 RX ORDER — ONDANSETRON 2 MG/ML
4 INJECTION INTRAMUSCULAR; INTRAVENOUS DAILY PRN
Status: DISCONTINUED | OUTPATIENT
Start: 2019-12-17 | End: 2019-12-17 | Stop reason: HOSPADM

## 2019-12-17 RX ORDER — HYDROCODONE BITARTRATE AND ACETAMINOPHEN 10; 325 MG/1; MG/1
1 TABLET ORAL EVERY 4 HOURS PRN
Status: DISCONTINUED | OUTPATIENT
Start: 2019-12-17 | End: 2019-12-17 | Stop reason: HOSPADM

## 2019-12-17 RX ORDER — HYDROCODONE BITARTRATE AND ACETAMINOPHEN 5; 325 MG/1; MG/1
1 TABLET ORAL EVERY 4 HOURS PRN
Status: DISCONTINUED | OUTPATIENT
Start: 2019-12-17 | End: 2019-12-17 | Stop reason: HOSPADM

## 2019-12-17 RX ORDER — FENTANYL CITRATE 50 UG/ML
INJECTION, SOLUTION INTRAMUSCULAR; INTRAVENOUS
Status: DISCONTINUED | OUTPATIENT
Start: 2019-12-17 | End: 2019-12-17

## 2019-12-17 RX ADMIN — GLYCOPYRROLATE 0.6 MG: 0.2 INJECTION, SOLUTION INTRAMUSCULAR; INTRAVENOUS at 12:12

## 2019-12-17 RX ADMIN — LIDOCAINE HYDROCHLORIDE 60 MG: 20 INJECTION, SOLUTION INTRAVENOUS at 08:12

## 2019-12-17 RX ADMIN — ROCURONIUM BROMIDE 20 MG: 10 INJECTION, SOLUTION INTRAVENOUS at 09:12

## 2019-12-17 RX ADMIN — CELECOXIB 400 MG: 200 CAPSULE ORAL at 07:12

## 2019-12-17 RX ADMIN — ALBUTEROL SULFATE 3 PUFF: 90 AEROSOL, METERED RESPIRATORY (INHALATION) at 09:12

## 2019-12-17 RX ADMIN — FAMOTIDINE 20 MG: 20 TABLET ORAL at 07:12

## 2019-12-17 RX ADMIN — CEFAZOLIN 2 G: 330 INJECTION, POWDER, FOR SOLUTION INTRAMUSCULAR; INTRAVENOUS at 08:12

## 2019-12-17 RX ADMIN — PREGABALIN 75 MG: 75 CAPSULE ORAL at 07:12

## 2019-12-17 RX ADMIN — GLYCOPYRROLATE 0.2 MG: 0.2 INJECTION, SOLUTION INTRAMUSCULAR; INTRAVENOUS at 09:12

## 2019-12-17 RX ADMIN — ROCURONIUM BROMIDE 10 MG: 10 INJECTION, SOLUTION INTRAVENOUS at 10:12

## 2019-12-17 RX ADMIN — CARBOXYMETHYLCELLULOSE SODIUM 2 DROP: 2.5 SOLUTION/ DROPS OPHTHALMIC at 08:12

## 2019-12-17 RX ADMIN — PHENYLEPHRINE HYDROCHLORIDE 100 MCG: 10 INJECTION INTRAVENOUS at 09:12

## 2019-12-17 RX ADMIN — SODIUM CHLORIDE, SODIUM LACTATE, POTASSIUM CHLORIDE, AND CALCIUM CHLORIDE: 600; 310; 30; 20 INJECTION, SOLUTION INTRAVENOUS at 10:12

## 2019-12-17 RX ADMIN — ROCURONIUM BROMIDE 10 MG: 10 INJECTION, SOLUTION INTRAVENOUS at 11:12

## 2019-12-17 RX ADMIN — DEXAMETHASONE SODIUM PHOSPHATE 8 MG: 4 INJECTION, SOLUTION INTRAMUSCULAR; INTRAVENOUS at 09:12

## 2019-12-17 RX ADMIN — OXYCODONE HYDROCHLORIDE 5 MG: 5 TABLET ORAL at 02:12

## 2019-12-17 RX ADMIN — PHENYLEPHRINE HYDROCHLORIDE 200 MCG: 10 INJECTION INTRAVENOUS at 11:12

## 2019-12-17 RX ADMIN — SODIUM CHLORIDE, SODIUM LACTATE, POTASSIUM CHLORIDE, AND CALCIUM CHLORIDE: 600; 310; 30; 20 INJECTION, SOLUTION INTRAVENOUS at 08:12

## 2019-12-17 RX ADMIN — FENTANYL CITRATE 50 MCG: 50 INJECTION, SOLUTION INTRAMUSCULAR; INTRAVENOUS at 09:12

## 2019-12-17 RX ADMIN — FENTANYL CITRATE 50 MCG: 50 INJECTION, SOLUTION INTRAMUSCULAR; INTRAVENOUS at 10:12

## 2019-12-17 RX ADMIN — ONDANSETRON 4 MG: 2 INJECTION INTRAMUSCULAR; INTRAVENOUS at 12:12

## 2019-12-17 RX ADMIN — ACETAMINOPHEN 1000 MG: 500 TABLET, FILM COATED ORAL at 07:12

## 2019-12-17 RX ADMIN — MIDAZOLAM HYDROCHLORIDE 2 MG: 1 INJECTION, SOLUTION INTRAMUSCULAR; INTRAVENOUS at 08:12

## 2019-12-17 RX ADMIN — NEOSTIGMINE METHYLSULFATE 5 MG: 1 INJECTION INTRAVENOUS at 12:12

## 2019-12-17 RX ADMIN — FENTANYL CITRATE 50 MCG: 50 INJECTION, SOLUTION INTRAMUSCULAR; INTRAVENOUS at 08:12

## 2019-12-17 RX ADMIN — PROPOFOL 200 MG: 10 INJECTION, EMULSION INTRAVENOUS at 08:12

## 2019-12-17 RX ADMIN — FENTANYL CITRATE 50 MCG: 50 INJECTION, SOLUTION INTRAMUSCULAR; INTRAVENOUS at 11:12

## 2019-12-17 RX ADMIN — DIPHENHYDRAMINE HYDROCHLORIDE 6.25 MG: 50 INJECTION, SOLUTION INTRAMUSCULAR; INTRAVENOUS at 09:12

## 2019-12-17 RX ADMIN — ROCURONIUM BROMIDE 50 MG: 10 INJECTION, SOLUTION INTRAVENOUS at 08:12

## 2019-12-17 NOTE — ANESTHESIA POSTPROCEDURE EVALUATION
Anesthesia Post Evaluation    Patient: Debbi Zhu    Procedure(s) Performed: Procedure(s) (LRB):  HYSTERECTOMY, TOTAL, LAPAROSCOPIC (N/A)  SALPINGO-OOPHORECTOMY, LAPAROSCOPIC (Bilateral)  REPAIR, BLADDER Laproscopic (N/A)    Final Anesthesia Type: general    Patient location during evaluation: PACU  Patient participation: Yes- Able to Participate  Level of consciousness: awake and alert  Post-procedure vital signs: reviewed and stable  Pain management: adequate  Airway patency: patent  SERENE mitigation strategies: Extubation while patient is awake  PONV status at discharge: No PONV  Anesthetic complications: no      Cardiovascular status: hemodynamically stable  Respiratory status: unassisted  Hydration status: euvolemic  Follow-up not needed.          Vitals Value Taken Time   /76 12/17/2019  3:20 PM   Temp 37.1 °C (98.7 °F) 12/17/2019  2:20 PM   Pulse 87 12/17/2019  3:20 PM   Resp 18 12/17/2019  3:20 PM   SpO2 95 % 12/17/2019  3:20 PM         Event Time     Out of Recovery 14:15:48          Pain/Jung Score: Pain Rating Prior to Med Admin: 7 (12/17/2019  2:56 PM)  Pain Rating Post Med Admin: 5 (12/17/2019  3:57 PM)  Jung Score: 10 (12/17/2019  3:20 PM)

## 2019-12-17 NOTE — OPERATIVE NOTE ADDENDUM
Certification of Assistant at Surgery       Surgery Date: 12/17/2019     Participating Surgeons:  Surgeon(s) and Role:     * Emmanuelle Davies MD - Primary     * Mike Blanco Jr., MD - Assisting     * Peewee Hartley MD - Consult    Procedures:  Procedure(s) (LRB):  HYSTERECTOMY, TOTAL, LAPAROSCOPIC (N/A)  SALPINGO-OOPHORECTOMY, LAPAROSCOPIC (Bilateral)  REPAIR, BLADDER Laproscopic (N/A)    Assistant Surgeon's Certification of Necessity:  I understand that section 1842 (b) (6) (d) of the Social Security Act generally prohibits Medicare Part B reasonable charge payment for the services of assistants at surgery in teaching hospitals when qualified residents are available to furnish such services. I certify that the services for which payment is claimed were medically necessary, and that no qualified resident was available to perform the services. I further understand that these services are subject to post-payment review by the Medicare carrier.      Mike Blanco Jr, MD    12/17/2019  1:12 PM    Mike Blanco MD

## 2019-12-17 NOTE — TRANSFER OF CARE
"Anesthesia Transfer of Care Note    Patient: Debbi Zhu    Procedure(s) Performed: Procedure(s) (LRB):  HYSTERECTOMY, TOTAL, LAPAROSCOPIC (N/A)  SALPINGO-OOPHORECTOMY, LAPAROSCOPIC (Bilateral)  REPAIR, BLADDER Laproscopic (N/A)    Patient location: PACU    Anesthesia Type: general    Transport from OR: Transported from OR on 2-3 L/min O2 by NC with adequate spontaneous ventilation    Post pain: adequate analgesia    Post assessment: no apparent anesthetic complications    Post vital signs: stable    Level of consciousness: responds to stimulation    Nausea/Vomiting: no nausea/vomiting    Complications: none    Transfer of care protocol was followed      Last vitals:   Visit Vitals  BP (!) 146/78   Pulse 81   Temp 37.1 °C (98.7 °F) (Oral)   Resp 18   Ht 5' 2" (1.575 m)   Wt 83.8 kg (184 lb 11.9 oz)   LMP 11/28/2019   SpO2 98%   Breastfeeding? No   BMI 33.79 kg/m²     "

## 2019-12-17 NOTE — OP NOTE
Operative Note       Surgery Date: 12/17/2019     Surgeon: Anoop Hartley MD    Assistant Surgeon: Dr. Emmanuelle Davies    Pre-op Diagnosis:  Abnormal uterine bleeding (AUB) [N93.9]    Post-op Diagnosis:      * Abnormal uterine bleeding (AUB)      * Cystotomy    Procedures:  Laparoscopic bladder repair    Anesthesia: General    Indications for Procedure:  The patient is a 44 y.o. year old female with history of 6 prior C-sections undergoing elective laparoscopic hysterectomy.  During initial dissection of the uterus off the bladder a cystotomy was noted by Dr. Davies and the bladder dome. I was consulted intra-operatively to assist with repair. Dr. Davies proceeded with the remainder of her hysterectomy and then bladder repair was performed at the end.     Procedure Description:  The patient was already under GETA with laparoscopic ports in place as documented in Dr. Davies's operative report. The cystotomy was examined; it was approximately 4cm in length and located at the dome. Dr. Davies had previously visualized the ureteral orifices through the cystotomy and noted normal clear efflux.    The mucosal and serosal layers were then reapproximated in a single layer due to the thin nature of the tissue. The closure was done using a V-loc (180) suture using the endostitch device. The layers came together well. Care was taken to ensure that mucosa was included with each stitch. Once the closure was completed the bladder was distended with 120cc of normal saline. No fluid leaks were noted laparoscopically, confirming a water tight repair. The catheter was then placed to gravity drainage.     The case was turned back over to Dr. Davies for closure. We will plan for cystogram in 2 weeks with follow-up with me same day for voiding trial.

## 2019-12-17 NOTE — OR NURSING
Debbi Taylor Marko has met all discharge criteria from Phase II. Vital Signs are stable, ambulating  without difficulty. Discharge instructions given, patient verbalized understanding. Leg bag supplies given. Discharged from facility via wheelchair in stable condition.

## 2019-12-17 NOTE — OR NURSING
COnfirmed with Dr. Davies that pt to be discharged today with lockett in p[lace (leg bag).     Pt AAOx4, VSS on RA. Abdominal incisions (lap sites x 3) c/d/i. Peripad in place with no drainage seen. Pt denies any pain. Lockett in place with clear yellow urine draining. Dr Hartley at BS prior to transfer to ACU. Family updated.

## 2019-12-17 NOTE — ANESTHESIA PROCEDURE NOTES
Intubation  Performed by: Ana Tinsley CRNA  Authorized by: Daiana Brooks MD     Intubation:     Induction:  Intravenous    Intubated:  Postinduction    Mask Ventilation:  Easy mask    Attempts:  1    Attempted By:  CRNA    Method of Intubation:  Video laryngoscopy    Blade:  Gonzalez 3    Laryngeal View Grade: Grade I - full view of chords      Difficult Airway Encountered?: No      Complications:  None    Airway Device:  Oral endotracheal tube    Airway Device Size:  7.0    Style/Cuff Inflation:  Cuffed    Inflation Amount (mL):  4    Tube secured:  22    Secured at:  The lips    Placement Verified By:  Capnometry    Complicating Factors:  Obesity    Findings Post-Intubation:  BS equal bilateral

## 2019-12-19 ENCOUNTER — TELEPHONE (OUTPATIENT)
Dept: UROLOGY | Facility: CLINIC | Age: 44
End: 2019-12-19

## 2019-12-19 NOTE — TELEPHONE ENCOUNTER
----- Message from Belkis Turk MA sent at 12/17/2019  1:00 PM CST -----  MD YIFAN Swenson Staff         This patient had a bladder repair performed in the OR today during her hysterectomy. Please contact her tomorrow to schedule her for a cystogram on 12/30 (order placed) with follow-up with me in the office (or [place]) that same day.

## 2019-12-19 NOTE — BRIEF OP NOTE
[facility]  Brief Operative Note    Surgery Date: 12/17/2019     Surgeon(s) and Role:     * Emmanuelle Davies MD - Primary     * Mike Blanco Jr., MD - Assisting     * Peewee Hartley MD - Consult        Pre-op Diagnosis:  Abnormal uterine bleeding (AUB) [N93.9]    Post-op Diagnosis:  Post-Op Diagnosis Codes:     * Abnormal uterine bleeding (AUB) [N93.9]    Procedure(s) (LRB):  HYSTERECTOMY, TOTAL, LAPAROSCOPIC (N/A)  SALPINGO-OOPHORECTOMY, LAPAROSCOPIC (Bilateral)  REPAIR, BLADDER Laproscopic (N/A)    Anesthesia: General    Description of the findings of the procedure(s): see op note    Estimated Blood Loss: 50 mL         Specimens:   Specimen (12h ago, onward)    None            Discharge Note    OUTCOME: Patient tolerated treatment/procedure well without complication and is now ready for discharge.    DISPOSITION: Home or Self Care    FINAL DIAGNOSIS:  <principal problem not specified>    FOLLOWUP: In clinic

## 2019-12-19 NOTE — OP NOTE
Date of operation 2019    Preoperative diagnosis:    1.  Abnormal uterine bleeding  2.  History of 6  sections  3.  Fibroids  4.  Anemia    Postoperative diagnosis:   same as above    Procedure:  1. Total laparoscopic hysterectomy with bilateral salpingo oophorectomy  2. Repair of cystotomy    Assistant: Mike Blanco MD as no qualified resident was available for the case.    Complications:  1. Cystotomy    Indication:  This is a 44 y.o. female with complications of abnormal uterine bleeding requiring blood transfusion.  She was also noted to have uterine fibroids as well.  Patient desired a hysterectomy.    EBL: see anesthesia note    Fluids: see anesthesia note    Procedure in detail:  The patient was taken to the operating room where general anesthesia was found to be adequate.  A time-out was performed confirming that this was Debbi Zhu who is going to be having the above-mentioned procedure. All members of the Surgical team agreed.    Two towel clips were placed on either side of the patient's umbilicus.  A Veress needle was inserted into the patient's umbilicus.  Water drop test was performed to confirm intra-abdominal placement.  The abdomen was insufflated to 15 mm of mercury.  A 10 cm incision was made at the umbilicus.  A 10 mm trocar was inserted into the abdomen with direct visualization. Inspection was made of the pelvic cavity. It was noted that there were omental adhesions to the anterior abdominal wall extending into the pelvis.  The uterus bilateral tubes and ovaries were noted to be normal. Two 5 mm trocars were inserted laterally.    Attention was then turned to the omental adhesions.  A LigaSure device was used to delicately cut and cauterized the omentum from the anterior abdominal wall.  Once there was adequate visualization of the uterus, steps for the hysterectomy were begun.  Attention was turned to the left round ligament which was cauterized and cut.  A bladder flap  was begun on the left side of the uterus with dissection of the left broad ligament medially.  At this point it was noted that the bladder was very densely adhered to the anterior abdominal wall and to the uterus.  The planes of the bladder and the uterus were very difficult to define.  A bladder flap was begun.  Attention was then turned to the right round ligament which was cauterized and cut and again carried down to the level of the bladder. Dissection of the bladder was continued however an incidental cystotomy was created. We were able to visualize the Warner bulb inside the bladder.     At this point Dr. Hartley was called into the operating room to visualize the cystotomy.  He noted that it would be appropriate for laparoscopic repair. Therefore he left the room, and hysterectomy was continued.  The left broad ligament was dissected toward the infundibulopelvic ligament which was then cauterized and cut ensuring to avoid the ureter.  The left side of the uterus was dissected down to the uterine arteries which were cauterized.  This procedure was then repeated on the right side of the uterus.  The left uterine arteries were cauterized.  Again attention was returned to the bladder. With the cystotomy, the edge of the bladder was able to be elevated upward, and the bladder was able to be dissected delicately off of the uterus and cervix until the cervicovaginal junction.  Attention was then returned to the posterior cervix where a colpotomy was created and carried laterally.  The uterine vessels were cauterized again and transected.  The colpotomy was carried around anteriorly ensuring that the bladder was well away from the colpotomy.  The uterus cervix bilateral fallopian tubes and ovaries were then removed from the vagina.    The laparoscopic camera was inserted into the bladder.  Efflux was visualized from bilateral ureters.  The hysterotomy was closed with an Endo Stitch V lock 2-0 Vicryl suture in a running  yordy.  Dr. Hartley was called back to the operating room. At this point he scrubbed into the case.  The bladder was repaired according to his note with a 2-0 v lock stitch using the endostitch device.  The bladder was retrograde filled and noted to be water tight. Irrigation was performed in the pelvis and hemostasis was noted.  The instruments were removed and the skin incisions were closed with 4-0 monocryl.  The patient tolerated the procedure well.     Emmanuelle Davies

## 2019-12-24 LAB
FINAL PATHOLOGIC DIAGNOSIS: NORMAL
GROSS: NORMAL

## 2019-12-27 ENCOUNTER — PATIENT OUTREACH (OUTPATIENT)
Dept: ADMINISTRATIVE | Facility: OTHER | Age: 44
End: 2019-12-27

## 2019-12-30 ENCOUNTER — OFFICE VISIT (OUTPATIENT)
Dept: UROLOGY | Facility: CLINIC | Age: 44
End: 2019-12-30
Payer: COMMERCIAL

## 2019-12-30 ENCOUNTER — HOSPITAL ENCOUNTER (OUTPATIENT)
Dept: RADIOLOGY | Facility: OTHER | Age: 44
Discharge: HOME | End: 2019-12-30
Attending: UROLOGY
Payer: COMMERCIAL

## 2019-12-30 VITALS
WEIGHT: 185.19 LBS | DIASTOLIC BLOOD PRESSURE: 87 MMHG | HEART RATE: 70 BPM | BODY MASS INDEX: 34.08 KG/M2 | SYSTOLIC BLOOD PRESSURE: 137 MMHG | HEIGHT: 62 IN

## 2019-12-30 DIAGNOSIS — S37.20XD INJURY OF BLADDER, SUBSEQUENT ENCOUNTER: ICD-10-CM

## 2019-12-30 DIAGNOSIS — Z98.890 S/P BLADDER REPAIR: Primary | ICD-10-CM

## 2019-12-30 PROCEDURE — 51700 IRRIGATION OF BLADDER: CPT | Mod: S$GLB,,, | Performed by: NURSE PRACTITIONER

## 2019-12-30 PROCEDURE — 3008F PR BODY MASS INDEX (BMI) DOCUMENTED: ICD-10-PCS | Mod: CPTII,S$GLB,, | Performed by: NURSE PRACTITIONER

## 2019-12-30 PROCEDURE — 3008F BODY MASS INDEX DOCD: CPT | Mod: CPTII,S$GLB,, | Performed by: NURSE PRACTITIONER

## 2019-12-30 PROCEDURE — 74430 FL CYSTOGRAM MIN 3 VIEWS RADIOLOGIST PERFORMED: ICD-10-PCS | Mod: 26,,, | Performed by: RADIOLOGY

## 2019-12-30 PROCEDURE — 63600175 PHARM REV CODE 636 W HCPCS: Performed by: UROLOGY

## 2019-12-30 PROCEDURE — 51700 PR IRRIGATION, BLADDER: ICD-10-PCS | Mod: S$GLB,,, | Performed by: NURSE PRACTITIONER

## 2019-12-30 PROCEDURE — 99213 PR OFFICE/OUTPT VISIT, EST, LEVL III, 20-29 MIN: ICD-10-PCS | Mod: 25,S$GLB,, | Performed by: NURSE PRACTITIONER

## 2019-12-30 PROCEDURE — 51600 INJECTION FOR BLADDER X-RAY: CPT | Mod: ,,, | Performed by: RADIOLOGY

## 2019-12-30 PROCEDURE — 74430 CONTRAST X-RAY BLADDER: CPT | Mod: 26,,, | Performed by: RADIOLOGY

## 2019-12-30 PROCEDURE — 51600 FL CYSTOGRAM MIN 3 VIEWS RADIOLOGIST PERFORMED: ICD-10-PCS | Mod: ,,, | Performed by: RADIOLOGY

## 2019-12-30 PROCEDURE — 51600 INJECTION FOR BLADDER X-RAY: CPT

## 2019-12-30 PROCEDURE — 99213 OFFICE O/P EST LOW 20 MIN: CPT | Mod: 25,S$GLB,, | Performed by: NURSE PRACTITIONER

## 2019-12-30 RX ORDER — OXYBUTYNIN CHLORIDE 5 MG/1
5 TABLET ORAL 3 TIMES DAILY PRN
Qty: 60 TABLET | Refills: 0 | Status: SHIPPED | OUTPATIENT
Start: 2019-12-30 | End: 2020-01-22

## 2019-12-30 RX ORDER — PHENAZOPYRIDINE HYDROCHLORIDE 200 MG/1
200 TABLET, FILM COATED ORAL 3 TIMES DAILY PRN
Qty: 30 TABLET | Refills: 3 | Status: SHIPPED | OUTPATIENT
Start: 2019-12-30 | End: 2020-01-09

## 2019-12-30 RX ADMIN — DIATRIZOATE MEGLUMINE 200 ML: 300 INJECTION, SOLUTION INTRAVENOUS at 09:12

## 2019-12-30 NOTE — PROGRESS NOTES
"Subjective:      Debbi Zhu is a 44 y.o. female who returns today regarding her lokcett catheter. She is an established patient of Dr. Hartley's and is new to me today.    The patient is s/p laparoscopic hysterectomy and intraoperative cystotomy by Dr. Davies with intraoperative repair by Dr. Hartley on 12/17. Cystogram was completed this morning.      She presents today for VT. Doing well. Denies fever/chills and gross hematuria.     The following portions of the patient's history were reviewed and updated as appropriate: allergies, current medications, past family history, past medical history, past social history, past surgical history and problem list.    Review of Systems  Constitutional: no fever or chills  ENT: no nasal congestion or sore throat  Respiratory: no cough or shortness of breath  Cardiovascular: no chest pain or palpitations  Gastrointestinal: no nausea or vomiting, tolerating diet  Genitourinary: as per HPI  Hematologic/Lymphatic: no easy bruising or lymphadenopathy  Musculoskeletal: no arthralgias or myalgias  Neurological: no seizures or tremors  Behavioral/Psych: no auditory or visual hallucinations     Objective:   Vital Signs:/87 (BP Location: Right arm, Patient Position: Sitting, BP Method: Large (Automatic))   Pulse 70   Ht 5' 2" (1.575 m)   Wt 84 kg (185 lb 3 oz)   BMI 33.87 kg/m²     Physical Exam   General: alert and oriented, no acute distress  Head: normocephalic, atraumatic  Neck: supple, no lymphadenopathy, normal ROM, no masses  Respiratory: Symmetric expansion, non-labored breathing  Cardiovascular: regular rate and rhythm, nomal pulses, no peripheral edema  Abdomen: soft, non tender, non distended, no palpable masses, no hernias, no hepatomegaly or splenomegaly  Pelvic: lockett to gravity with clear, yudith urine   Lymphatic: no inguinal nodes  Skin: normal coloration and turgor, no rashes, no suspicious skin lesions noted  Neuro: alert and oriented x3, no gross " "deficits  Psych: normal judgment and insight, normal mood/affect and non-anxious    Lab Review   Urinalysis demonstrates : no urine, lockett   Lab Results   Component Value Date    WBC 6.42 12/04/2019    HGB 10.6 (L) 12/04/2019    HCT 34.6 (L) 12/04/2019    MCV 79 (L) 12/04/2019     12/04/2019     Lab Results   Component Value Date    CREATININE 0.8 12/04/2019    BUN 10 12/04/2019       Imaging   (all images personally reviewed; agree with report below)  Cystogram (12/30/19)- "Unremarkable fluoroscopic cystogram specifically without evidence for bladder leak as detailed above"    Voiding Trial (Fill/Pull/Void): 200cc of sterile saline was instilled into the bladder through the previously placed lockett catheter.  The lockett balloon was then deflated and the lockett removed.  The patient subsequently voided 200cc, consistent with successful voiding trial.  The lockett was not replaced.    Assessment:   S/p bladder repair     Plan:   Debbi was seen today for other.    Diagnoses and all orders for this visit:    S/P bladder repair    Other orders  -     phenazopyridine (PYRIDIUM) 200 MG tablet; Take 1 tablet (200 mg total) by mouth 3 (three) times daily as needed.  -     oxybutynin (DITROPAN) 5 MG Tab; Take 1 tablet (5 mg total) by mouth 3 (three) times daily as needed (bladder spasms, bladder pain).    Plan:  --Successful VT  --Okay to continue ditropan and pyridium PRN, instructed to d/c ditropan if she develops difficulty voiding  --Encouraged regular voiding  --Discussed the SS of vesicovaginal fistula  --Follow up in 2 weeks for PVR       "

## 2020-01-06 ENCOUNTER — PATIENT OUTREACH (OUTPATIENT)
Dept: ADMINISTRATIVE | Facility: HOSPITAL | Age: 45
End: 2020-01-06

## 2020-01-09 ENCOUNTER — PATIENT OUTREACH (OUTPATIENT)
Dept: ADMINISTRATIVE | Facility: OTHER | Age: 45
End: 2020-01-09

## 2020-01-09 ENCOUNTER — PATIENT MESSAGE (OUTPATIENT)
Dept: OBSTETRICS AND GYNECOLOGY | Facility: CLINIC | Age: 45
End: 2020-01-09

## 2020-01-13 ENCOUNTER — PATIENT MESSAGE (OUTPATIENT)
Dept: INTERNAL MEDICINE | Facility: CLINIC | Age: 45
End: 2020-01-13

## 2020-01-13 ENCOUNTER — OFFICE VISIT (OUTPATIENT)
Dept: UROLOGY | Facility: CLINIC | Age: 45
End: 2020-01-13
Payer: COMMERCIAL

## 2020-01-13 VITALS
SYSTOLIC BLOOD PRESSURE: 153 MMHG | HEART RATE: 81 BPM | HEIGHT: 62 IN | DIASTOLIC BLOOD PRESSURE: 97 MMHG | BODY MASS INDEX: 34.04 KG/M2 | WEIGHT: 185 LBS

## 2020-01-13 DIAGNOSIS — Z98.890 S/P BLADDER REPAIR: ICD-10-CM

## 2020-01-13 DIAGNOSIS — R35.0 URINARY FREQUENCY: Primary | ICD-10-CM

## 2020-01-13 LAB
BILIRUB SERPL-MCNC: ABNORMAL MG/DL
BLOOD URINE, POC: 250
COLOR, POC UA: YELLOW
GLUCOSE UR QL STRIP: NORMAL
KETONES UR QL STRIP: NEGATIVE
LEUKOCYTE ESTERASE URINE, POC: ABNORMAL
NITRITE, POC UA: POSITIVE
PH, POC UA: 5
POC RESIDUAL URINE VOLUME: 13 ML (ref 0–100)
PROTEIN, POC: ABNORMAL
SPECIFIC GRAVITY, POC UA: 1.02
UROBILINOGEN, POC UA: NORMAL

## 2020-01-13 PROCEDURE — 81002 URINALYSIS NONAUTO W/O SCOPE: CPT | Mod: S$GLB,,, | Performed by: NURSE PRACTITIONER

## 2020-01-13 PROCEDURE — 51798 POCT BLADDER SCAN: ICD-10-PCS | Mod: S$GLB,,, | Performed by: NURSE PRACTITIONER

## 2020-01-13 PROCEDURE — 99213 PR OFFICE/OUTPT VISIT, EST, LEVL III, 20-29 MIN: ICD-10-PCS | Mod: 25,S$GLB,, | Performed by: NURSE PRACTITIONER

## 2020-01-13 PROCEDURE — 99213 OFFICE O/P EST LOW 20 MIN: CPT | Mod: 25,S$GLB,, | Performed by: NURSE PRACTITIONER

## 2020-01-13 PROCEDURE — 3008F PR BODY MASS INDEX (BMI) DOCUMENTED: ICD-10-PCS | Mod: CPTII,S$GLB,, | Performed by: NURSE PRACTITIONER

## 2020-01-13 PROCEDURE — 81002 POCT URINE DIPSTICK WITHOUT MICROSCOPE: ICD-10-PCS | Mod: S$GLB,,, | Performed by: NURSE PRACTITIONER

## 2020-01-13 PROCEDURE — 87086 URINE CULTURE/COLONY COUNT: CPT

## 2020-01-13 PROCEDURE — 51798 US URINE CAPACITY MEASURE: CPT | Mod: S$GLB,,, | Performed by: NURSE PRACTITIONER

## 2020-01-13 PROCEDURE — 3008F BODY MASS INDEX DOCD: CPT | Mod: CPTII,S$GLB,, | Performed by: NURSE PRACTITIONER

## 2020-01-14 LAB
BACTERIA UR CULT: NORMAL
BACTERIA UR CULT: NORMAL

## 2020-01-17 RX ORDER — FERROUS SULFATE 325(65) MG
325 TABLET ORAL
Qty: 90 TABLET | Refills: 0 | COMMUNITY
Start: 2020-01-17 | End: 2020-01-21 | Stop reason: SDUPTHER

## 2020-01-20 ENCOUNTER — PATIENT MESSAGE (OUTPATIENT)
Dept: UROLOGY | Facility: CLINIC | Age: 45
End: 2020-01-20

## 2020-01-21 NOTE — PROGRESS NOTES
Refill Routing Note     Medication(s) are not appropriate for processing by [facility]:    Medication Outside of Protocol    Appointments  past 12m or future 3m with PCP    Date Provider   Last Visit   10/29/2019 Shamir Eubanks MD   Next Visit   2/21/2020 Shamir Eubanks MD           Automatic Epic Protocol Generated Data:    Requested Prescriptions   Pending Prescriptions Disp Refills    ferrous sulfate (FEOSOL) 325 mg (65 mg iron) Tab tablet 90 tablet 0     Sig: Take 1 tablet (325 mg total) by mouth 3 (three) times daily with meals.       There is no refill protocol information for this order

## 2020-01-22 RX ORDER — OXYBUTYNIN CHLORIDE 5 MG/1
5 TABLET ORAL 3 TIMES DAILY PRN
Qty: 60 TABLET | Refills: 0 | Status: SHIPPED | OUTPATIENT
Start: 2020-01-22 | End: 2021-03-01

## 2020-01-26 ENCOUNTER — PATIENT MESSAGE (OUTPATIENT)
Dept: INTERNAL MEDICINE | Facility: CLINIC | Age: 45
End: 2020-01-26

## 2020-01-27 RX ORDER — FERROUS SULFATE 325(65) MG
325 TABLET ORAL
Qty: 90 TABLET | Refills: 0 | COMMUNITY
Start: 2020-01-27 | End: 2021-03-01 | Stop reason: SDUPTHER

## 2020-01-27 NOTE — TELEPHONE ENCOUNTER
Refill Routing Note     Medication(s) are appropriate for refill:    Medication Outside of Protocol        Appointments  past 15m or future 3m with PCP    Date Provider   Last Visit   10/29/2019 Shamir Eubanks MD   Next Visit   2/21/2020 Shamir Eubanks MD       Automatic Epic Protocol Generated Data:    Requested Prescriptions   Pending Prescriptions Disp Refills    ferrous sulfate (FEOSOL) 325 mg (65 mg iron) Tab tablet 90 tablet 0     Sig: Take 1 tablet (325 mg total) by mouth 3 (three) times daily with meals.       There is no refill protocol information for this order

## 2020-01-28 RX ORDER — FERROUS SULFATE 325(65) MG
325 TABLET ORAL
Qty: 90 TABLET | Refills: 0 | OUTPATIENT
Start: 2020-01-28

## 2020-01-29 RX ORDER — POTASSIUM CHLORIDE 750 MG/1
20 CAPSULE, EXTENDED RELEASE ORAL DAILY
Qty: 180 CAPSULE | Refills: 1 | Status: SHIPPED | OUTPATIENT
Start: 2020-01-29 | End: 2020-03-26 | Stop reason: SDUPTHER

## 2020-01-29 NOTE — PROGRESS NOTES
Refill Authorization Note     is requesting a refill authorization.    Brief assessment and rationale for refill: APPROVE: prr                                         Comments:   Requested Prescriptions   Pending Prescriptions Disp Refills    potassium chloride (MICRO-K) 10 MEQ CpSR [Pharmacy Med Name: POTASSIUM CL ER 10 MEQ CAPSULE] 180 capsule 1     Sig: TAKE 2 CAPSULES (20 MEQ TOTAL) BY MOUTH ONCE DAILY.       Endocrinology:  Minerals - Potassium Supplementation Passed - 1/27/2020 11:35 AM        Passed - Patient is at least 18 years old        Passed - Negative Pregnancy Status Check        Passed - Office visit in past 12 months or future 90 days     Recent Outpatient Visits            2 weeks ago Urinary frequency    [facility] [place] [address] Jessica ORALIA Tang, NP    1 month ago S/P bladder repair    [facility] [place] [address] Jessica L. Tang, NP    1 month ago Pre-op exam    [facility] [id number] [place] [address] Emmanuelle Davies MD    3 months ago Symptomatic anemia    [facility] Shamir Eubanks MD    3 months ago Anemia, unspecified type    [facility] FIDEL Ramos          Future Appointments              In 2 days MD [facility] - OB/GYN 5th Floor, [place]    In 3 weeks Shamir Eubanks MD [facility], [facility]                Passed - K in normal range and within 180 days     Potassium   Date Value Ref Range Status   01/13/2020 3.7 3.5 - 5.1 mmol/L Final   12/17/2019 3.3 (L) 3.5 - 5.1 mmol/L Final   12/04/2019 2.9 (L) 3.5 - 5.1 mmol/L Final              Passed - Cr is 1.4 or below and within 180 days     Creatinine   Date Value Ref Range Status   12/04/2019 0.8 0.5 - 1.4 mg/dL Final   10/16/2019 0.8 0.5 - 1.4 mg/dL Final   10/15/2019 0.8 0.5 - 1.4 mg/dL Final              Passed - eGFR within 180 days     eGFR if non    Date Value Ref Range Status   12/04/2019 >60 >60  mL/min/1.73 m^2 Final     Comment:     Calculation used to obtain the estimated glomerular filtration  rate (eGFR) is the CKD-EPI equation.      10/16/2019 >60 >60 mL/min/1.73 m^2 Final     Comment:     Calculation used to obtain the estimated glomerular filtration  rate (eGFR) is the CKD-EPI equation.      10/15/2019 >60 >60 mL/min/1.73 m^2 Final     Comment:     Calculation used to obtain the estimated glomerular filtration  rate (eGFR) is the CKD-EPI equation.        eGFR if    Date Value Ref Range Status   12/04/2019 >60 >60 mL/min/1.73 m^2 Final   10/16/2019 >60 >60 mL/min/1.73 m^2 Final   10/15/2019 >60 >60 mL/min/1.73 m^2 Final

## 2020-01-31 ENCOUNTER — OFFICE VISIT (OUTPATIENT)
Dept: OBSTETRICS AND GYNECOLOGY | Facility: CLINIC | Age: 45
End: 2020-01-31
Payer: COMMERCIAL

## 2020-01-31 VITALS
WEIGHT: 188.5 LBS | BODY MASS INDEX: 34.69 KG/M2 | HEIGHT: 62 IN | SYSTOLIC BLOOD PRESSURE: 130 MMHG | DIASTOLIC BLOOD PRESSURE: 90 MMHG

## 2020-01-31 DIAGNOSIS — Z98.890 POST-OPERATIVE STATE: Primary | ICD-10-CM

## 2020-01-31 PROCEDURE — 99999 PR PBB SHADOW E&M-EST. PATIENT-LVL III: ICD-10-PCS | Mod: PBBFAC,,, | Performed by: OBSTETRICS & GYNECOLOGY

## 2020-01-31 PROCEDURE — 99024 PR POST-OP FOLLOW-UP VISIT: ICD-10-PCS | Mod: S$GLB,,, | Performed by: OBSTETRICS & GYNECOLOGY

## 2020-01-31 PROCEDURE — 99999 PR PBB SHADOW E&M-EST. PATIENT-LVL III: CPT | Mod: PBBFAC,,, | Performed by: OBSTETRICS & GYNECOLOGY

## 2020-01-31 PROCEDURE — 99024 POSTOP FOLLOW-UP VISIT: CPT | Mod: S$GLB,,, | Performed by: OBSTETRICS & GYNECOLOGY

## 2020-01-31 NOTE — PROGRESS NOTES
Gynecology    SUBJECTIVE:     Chief Complaint: Post-op Evaluation (sharp pain right side)       History of Present Illness:  Postoperative Follow-up  Patient presents to the clinic 6 weeks status post TLH/BSO and cystotomy repair for abnormal uterine bleeding. Eating a regular diet without difficulty. Bowel movements are normal. Her pain for the most part is gone, but has on a couple of occasions had a lower right sided pain that feels like stabbing discomfort.  It is improved with ibuprofen.   She has had a spot of blood in her panties a few times.  Does need to use ditropan still for incontinence.  But is okay with urinating otherwise.      Review of Systems:  Review of Systems   Constitutional: Negative for appetite change, fever and unexpected weight change.   Respiratory: Negative for shortness of breath.    Cardiovascular: Negative for chest pain.   Gastrointestinal: Negative for nausea and vomiting.   Genitourinary: Negative for menorrhagia, menstrual problem, pelvic pain, vaginal bleeding, vaginal discharge and vaginal pain.        OBJECTIVE:     Physical Exam:  Physical Exam   Constitutional: She is oriented to person, place, and time. She appears well-developed and well-nourished.   Pulmonary/Chest: Effort normal.   Abdominal: Soft.   Genitourinary: Vagina normal. No labial fusion. There is no rash, tenderness, lesion or injury on the right labia. There is no rash, tenderness, lesion or injury on the left labia. Right adnexum displays no mass, no tenderness and no fullness. Left adnexum displays no mass, no tenderness and no fullness. No erythema, tenderness or bleeding in the vagina. No foreign body in the vagina. No signs of injury around the vagina. No vaginal discharge found.   Genitourinary Comments: Urethra: normal appearing urethra with no masses, tenderness or lesions  Urethral meatus: normal size, anterior vaginal wall with no prolapse, no lesions  Cuff well healed  Abdominal incisions well healed    Neurological: She is alert and oriented to person, place, and time.   Psychiatric: She has a normal mood and affect. Her behavior is normal. Judgment and thought content normal.   Nursing note and vitals reviewed.        ASSESSMENT:       ICD-10-CM ICD-9-CM    1. Post-operative state Z98.890 V45.89           Plan:      Debbi was seen today for post-op evaluation.    Diagnoses and all orders for this visit:    Post-operative state    - healing well from surgery;  - recommended avoiding intercourse for a few more weeks  - notify me if pain returns or worsens    No orders of the defined types were placed in this encounter.      No follow-ups on file.    Emmanuelle Davies

## 2020-02-07 ENCOUNTER — PATIENT OUTREACH (OUTPATIENT)
Dept: ADMINISTRATIVE | Facility: HOSPITAL | Age: 45
End: 2020-02-07

## 2020-02-12 ENCOUNTER — PATIENT OUTREACH (OUTPATIENT)
Dept: OTHER | Facility: OTHER | Age: 45
End: 2020-02-12

## 2020-02-12 DIAGNOSIS — I10 HYPERTENSION, ESSENTIAL: Primary | ICD-10-CM

## 2020-02-12 NOTE — PROGRESS NOTES
Attempted contact for possible medication change.     Blood pressures have trended up. DBPs are significantly elevated.     Last 5 Patient Entered Readings                                      Current 30 Day Average: 129/94     Recent Readings 2/11/2020 2/10/2020 2/10/2020 1/31/2020 1/31/2020    SBP (mmHg) 142 147 156 130 130    DBP (mmHg) 91 105 104 90 90    Pulse 78 80 78 94 85        Would like benefit from increase in HCTZ dose. Could utilize triamterene for potassium stabilization given history of hypokalemia and current potassium supplementation.

## 2020-02-14 NOTE — PROGRESS NOTES
Digital Medicine: Health  Follow-Up    Unable to LVM.    Current avg /96mmHg is above <130/80mmHg goal.    Follow up in 1 wk.          Intervention/Plan    There are no preventive care reminders to display for this patient.    Last 5 Patient Entered Readings                                      Current 30 Day Average: 133/96     Recent Readings 2/13/2020 2/12/2020 2/11/2020 2/10/2020 2/10/2020    SBP (mmHg) 144 131 142 147 156    DBP (mmHg) 97 99 91 105 104    Pulse 83 76 78 80 78                  Screenings    SDOH

## 2020-02-18 RX ORDER — VALSARTAN 80 MG/1
80 TABLET ORAL DAILY
Qty: 90 TABLET | Refills: 3 | Status: SHIPPED | OUTPATIENT
Start: 2020-02-18 | End: 2020-03-03

## 2020-02-18 NOTE — PROGRESS NOTES
Digital Medicine: Clinician Follow-Up    High blood pressure alert outreach.     Denies s/s of hypertension. Reports she recently started back to work after recovering from surgery. Works from home. Endorses issues sleeping and increased stress.     The history is provided by the patient.     Follow Up  Follow-up reason(s): reading review and medication change      Readings are trending up due to lifestyle change and increased stress.    Medication Change: new med        INTERVENTION(S)  recommended diet modifications, reviewed monitoring technique, recommended med change and encouragement/support    PLAN  patient verbalizes understanding, patient amenable to changes and additional monitoring needed    Blood pressures are variable but persistently elevated mainly driven by DBPs.   - Based on hypokalemia and elevated DBPs, suspect excess sodium in diet. Reviewed diet and pointed out specific contributors. Cooks at home. Patient to monitor labels for sodium content and reduce use of salt.   - Reviewed monitoring technique. Suspect with starting back to work and working from home patient is not resting sufficiently. Patient agrees. Discussed relaxation before readings. Discussed prioritizing sleep and self-care time.   - History of profound hypokalemia on low dose HCTZ. Will not titrate at this time. Will use ARB and consider spironolactone, if poor response. Then may titrate HCTZ based on labs.    Will START valsartan 80mg daily.     Will schedule next outreach in 2 weeks.       There are no preventive care reminders to display for this patient.    Last 5 Patient Entered Readings                                      Current 30 Day Average: 136/98     Recent Readings 2/18/2020 2/17/2020 2/17/2020 2/17/2020 2/17/2020    SBP (mmHg) 130 145 146 145 148    DBP (mmHg) 92 98 94 117 100    Pulse 84 89 89 86 88             Hypertension Medications             amLODIPine (NORVASC) 10 MG tablet Take 1 tablet (10 mg total) by  mouth once daily.    hydroCHLOROthiazide (HYDRODIURIL) 12.5 MG Tab Take 1 tablet (12.5 mg total) by mouth once daily.                 Screenings

## 2020-02-21 NOTE — PROGRESS NOTES
Digital Medicine: Health  Follow-Up    VM is full.     Current avg /98 mmHg is above <130/80mmHg goal.     Follow up in 2 wks.          Intervention/Plan    There are no preventive care reminders to display for this patient.    Last 5 Patient Entered Readings                                      Current 30 Day Average: 136/98     Recent Readings 2/18/2020 2/17/2020 2/17/2020 2/17/2020 2/17/2020    SBP (mmHg) 130 145 146 145 148    DBP (mmHg) 92 98 94 117 100    Pulse 84 89 89 86 88                  Screenings    SDOH

## 2020-02-24 DIAGNOSIS — I10 HYPERTENSION, ESSENTIAL: Primary | ICD-10-CM

## 2020-02-24 DIAGNOSIS — R79.9 ABNORMAL BLOOD CHEMISTRY: ICD-10-CM

## 2020-02-24 DIAGNOSIS — D64.9 ANEMIA, UNSPECIFIED TYPE: ICD-10-CM

## 2020-02-24 NOTE — PROGRESS NOTES
Refill Routing Note     Medication(s) are not appropriate for processing by [facility]:    Medication Outside of Protocol    Appointments  past 12m or future 3m with PCP    Date Provider   Last Visit   10/29/2019 Shamir Eubanks MD   Next Visit   Visit date not found Shamir Eubanks MD           Automatic Epic Protocol Generated Data:    Requested Prescriptions   Pending Prescriptions Disp Refills    ferrous sulfate (FEOSOL) 325 mg (65 mg iron) Tab tablet 90 tablet 0     Sig: Take 1 tablet (325 mg total) by mouth 3 (three) times daily with meals.       There is no refill protocol information for this order           Note composed:10:14 AM 02/24/2020

## 2020-02-26 RX ORDER — FERROUS SULFATE 325(65) MG
325 TABLET ORAL
Qty: 90 TABLET | Refills: 0 | COMMUNITY
Start: 2020-02-26

## 2020-02-26 NOTE — TELEPHONE ENCOUNTER
Patient is due for a follow up appointment - please call patient to schedule appointment. Also, please review lab orders placed and schedule before appointment. Thank you.

## 2020-02-26 NOTE — TELEPHONE ENCOUNTER
Spoke with pt and pt said she has received the msg via portal and will have to call back after checking her calendar.

## 2020-03-02 DIAGNOSIS — I10 HYPERTENSION, ESSENTIAL: ICD-10-CM

## 2020-03-02 RX ORDER — VALSARTAN 80 MG/1
80 TABLET ORAL DAILY
Qty: 90 TABLET | Refills: 3 | Status: CANCELLED | OUTPATIENT
Start: 2020-03-02 | End: 2021-03-02

## 2020-03-03 ENCOUNTER — PATIENT OUTREACH (OUTPATIENT)
Dept: OTHER | Facility: OTHER | Age: 45
End: 2020-03-03

## 2020-03-03 DIAGNOSIS — I10 HYPERTENSION, ESSENTIAL: ICD-10-CM

## 2020-03-03 RX ORDER — VALSARTAN 80 MG/1
80 TABLET ORAL DAILY
Qty: 60 TABLET | Refills: 3 | Status: SHIPPED | OUTPATIENT
Start: 2020-03-03 | End: 2020-10-06

## 2020-03-03 NOTE — PROGRESS NOTES
Patient called and left message saying the pharmacy did not have the prescription.     Epic system shows receipt confirmation. Called pharmacy they do not have the prescription but do have the medication. Resent prescription.     Called and confirmed receipt. Informed patient. Will follow-up in 2 weeks.

## 2020-03-03 NOTE — PROGRESS NOTES
Digital Medicine: Health  Follow-Up    Routine HC f/u for DM HTN.    Overall, pt feels well without any new complaints. She is pleased with BP readings with recent med changes. (Task placed for tech support since pt reports submitting BP readings over the weekend that are not populated in her chart )    The history is provided by the patient.     Follow Up  Follow-up reason(s): routine education          INTERVENTION(S)  encouragement/support    PLAN  continue monitoring    Pt does not have any question or concerns at this time.    Praised pt for healthy life style changes, encouraged pt to keep up the great work!              Topic    Lipid (Cholesterol) Test        Last 5 Patient Entered Readings                                      Current 30 Day Average: 140/101     Recent Readings 3/3/2020 3/2/2020 3/2/2020 3/2/2020 3/2/2020    SBP (mmHg) 129 150 148 140 139    DBP (mmHg) 90 105 98 96 96    Pulse 86 87 87 85 81                      Diet Screening   No change to diet.  She has the following dietary restrictions: low sodium diet    Pt confirmed dietary compliance by following a low sodium diet and cutting out processed foods.    Physical Activity Screening   No change to exercise routine.    When asked if exercising, patient responded: yes    Patient participates in the following activities: walking and yard/housework    Pt reports an increase to her daily activity by walking and jumping in a trampoline with her daughter.      SDOH

## 2020-03-03 NOTE — PROGRESS NOTES
2-week medication change follow-up. No answer - no VM available. Sent [employer] message.     At last outreach, started valsartan 80mg daily. Also discussed sodium reduction and stress reduction.     Last 5 Patient Entered Readings                                      Current 30 Day Average: 142/101     Recent Readings 3/2/2020 3/2/2020 3/2/2020 3/2/2020 3/2/2020    SBP (mmHg) 150 148 140 139 146    DBP (mmHg) 105 98 96 96 106    Pulse 87 87 85 81 86        Readings have not improved. Patient has been taking an excess of readings.     Would like to titrate therapy and discuss reason for extra readings and encourage sodium reduction. Unlikely to meet goal without sodium reduction.

## 2020-03-17 ENCOUNTER — PATIENT OUTREACH (OUTPATIENT)
Dept: OTHER | Facility: OTHER | Age: 45
End: 2020-03-17

## 2020-03-17 NOTE — PROGRESS NOTES
Received MyChart reply.     Patient doing well; no issues at this time. Hypotensive symptoms upon initiation but resolved after a few days.     Will continue current regimen.     Will schedule next outreach in 4-8 weeks.

## 2020-03-17 NOTE — PROGRESS NOTES
2-week medication change follow-up. No answer - VM full unable to leave message. Sent [email or web address] message.    At last outreach, started valsartan 80mg daily.     Last 5 Patient Entered Readings                                      Current 30 Day Average: 128/97     Recent Readings 3/11/2020 3/10/2020 3/9/2020 3/5/2020 3/3/2020    SBP (mmHg) 106 115 123 110 131    DBP (mmHg) 75 94 87 75 92    Pulse 79 75 77 84 89        Readings significantly lower. Would like to monitor for s/s of hypotension and tolerability.     Will continue to monitor and reach out.

## 2020-03-26 RX ORDER — POTASSIUM CHLORIDE 750 MG/1
20 CAPSULE, EXTENDED RELEASE ORAL DAILY
Qty: 180 CAPSULE | Refills: 0 | Status: SHIPPED | OUTPATIENT
Start: 2020-03-26 | End: 2021-01-05

## 2020-03-30 RX ORDER — POTASSIUM CHLORIDE 750 MG/1
20 CAPSULE, EXTENDED RELEASE ORAL DAILY
Qty: 180 CAPSULE | Refills: 1 | OUTPATIENT
Start: 2020-03-30

## 2020-03-30 NOTE — PROGRESS NOTES
Refill Authorization Note     is requesting a refill authorization.    Brief assessment and rationale for refill: QUICK DC: duplicate                                         Comments:   Last Prescribed Info:   Outpatient Medication Detail      Disp Refills Start End    potassium chloride (MICRO-K) 10 MEQ CpSR 180 capsule 0 3/26/2020     Sig - Route: Take 2 capsules (20 mEq total) by mouth once daily. - Oral    Sent to pharmacy as: potassium chloride (MICRO-K) 10 MEQ CpSR    Notes to Pharmacy: Please delete all prior scripts with same name and strength including on holds.    E-Prescribing Status: Receipt confirmed by pharmacy (3/26/2020  6:39 PM CDT)    Ordering Encounter Report     Associated Reports   View Encounter

## 2020-04-21 ENCOUNTER — PATIENT OUTREACH (OUTPATIENT)
Dept: OTHER | Facility: OTHER | Age: 45
End: 2020-04-21

## 2020-04-21 DIAGNOSIS — Z01.84 ANTIBODY RESPONSE EXAMINATION: ICD-10-CM

## 2020-04-21 NOTE — PROGRESS NOTES
"Digital Medicine: Health  Follow-Up    Called pt for follow up. Pt feels well, without complaints. Pt is very appreciative of her care team and the DM program. She has los 20lbs since January 2020 by exercising and following a 1200 calorie meal plan. She is pleased with her medication regimen and BP readings.           INTERVENTION(S)  encouragement/support    PLAN  continue monitoring    Praised and congratulated pt on her success with lifestyle changes, she is doing fantastic! Encouraged Ms. Zhu to keep up the great work.          Topic    Lipid (Cholesterol) Test        Last 5 Patient Entered Readings                                      Current 30 Day Average: 109/77     Recent Readings 4/19/2020 4/14/2020 4/9/2020 4/8/2020 4/8/2020    SBP (mmHg) 96 112 111 115 122    DBP (mmHg) 76 80 84 50 86    Pulse 78 64 72 70 73                      Diet Screening   No change to diet.  Patient reports eating or drinking the following: water and fresh vegetables    The patient drinks 64 ounces of water per day.    She has the following dietary restrictions: weight-loss and low sodium diet    Barriers to a Healthy Diet: no barriers to healthy eating    Pt reports that her energy level has increased since has cut out fast food and processed foods. She opts for fresh fruits, vegetables and lean proteins.    Physical Activity Screening   When asked if exercising, patient responded: yes    She identified the following barriers to physical activity: no barriers to being active     Pt reports no longer having a sedentary lifestyle and describes her self as being " mobile " by always being on the go and moving around.    Medication Adherence Screening   She did not miss a dose this month.      SDOH  "

## 2020-04-23 RX ORDER — HYDROCHLOROTHIAZIDE 12.5 MG/1
TABLET ORAL
Qty: 90 TABLET | Refills: 0 | Status: SHIPPED | OUTPATIENT
Start: 2020-04-23 | End: 2021-01-20 | Stop reason: SDUPTHER

## 2020-04-23 NOTE — TELEPHONE ENCOUNTER
Refill Authorization Note     is requesting a refill authorization.    Brief assessment and rationale for refill: APPROVE: prr          Medication Therapy Plan: 30-day avg BP <140/90 mmHg; Approve 3 more                              Comments:   Last 5 Patient Entered Readings                                      Current 30 Day Average: 109/77     Recent Readings 4/19/2020 4/14/2020 4/9/2020 4/8/2020 4/8/2020    SBP (mmHg) 96 112 111 115 122    DBP (mmHg) 76 80 84 50 86    Pulse 78 64 72 70 73            Requested Prescriptions   Signed Prescriptions Disp Refills    hydroCHLOROthiazide (HYDRODIURIL) 12.5 MG Tab 90 tablet 0     Sig: TAKE 1 TABLET BY MOUTH EVERY DAY       Cardiovascular: Diuretics - Thiazide Failed - 4/18/2020 12:34 AM        Failed - Last BP in normal range within 360 days.     BP Readings from Last 3 Encounters:   01/31/20 (!) 130/90   01/13/20 (!) 153/97   12/30/19 137/87              Passed - Patient is at least 18 years old        Passed - Negative Pregnancy Status Check        Passed - Office visit in past 12 months or future 90 days.     Recent Outpatient Visits            2 months ago Post-operative state    [facility] Emmanuelle Davies MD    3 months ago Urinary frequency    [facility] Jessica Beckwith NP    3 months ago S/P bladder repair    [facility] Jessica Beckwith NP    4 months ago Pre-op exam    [facility] Emmanuelle Davies MD    5 months ago Symptomatic anemia    [facility] Shamir Eubanks MD                    Passed - Ca in normal range and within 360 days     Calcium   Date Value Ref Range Status   12/04/2019 9.1 8.7 - 10.5 mg/dL Final   10/16/2019 9.3 8.7 - 10.5 mg/dL Final   10/15/2019 9.3 8.7 - 10.5 mg/dL Final              Passed - Cr is 1.3 or below and within 180 days     Creatinine   Date Value Ref Range Status   12/04/2019 0.8 0.5 - 1.4 mg/dL Final    10/16/2019 0.8 0.5 - 1.4 mg/dL Final   10/15/2019 0.8 0.5 - 1.4 mg/dL Final              Passed - K in normal range and within 180 days     Potassium   Date Value Ref Range Status   01/13/2020 3.7 3.5 - 5.1 mmol/L Final   12/17/2019 3.3 (L) 3.5 - 5.1 mmol/L Final   12/04/2019 2.9 (L) 3.5 - 5.1 mmol/L Final              Passed - Na is between 130 and 148 and within 180 days     Sodium   Date Value Ref Range Status   12/04/2019 142 136 - 145 mmol/L Final   10/16/2019 138 136 - 145 mmol/L Final   10/15/2019 139 136 - 145 mmol/L Final              Passed - eGFR within 180 days     eGFR if non    Date Value Ref Range Status   12/04/2019 >60 >60 mL/min/1.73 m^2 Final     Comment:     Calculation used to obtain the estimated glomerular filtration  rate (eGFR) is the CKD-EPI equation.      10/16/2019 >60 >60 mL/min/1.73 m^2 Final     Comment:     Calculation used to obtain the estimated glomerular filtration  rate (eGFR) is the CKD-EPI equation.      10/15/2019 >60 >60 mL/min/1.73 m^2 Final     Comment:     Calculation used to obtain the estimated glomerular filtration  rate (eGFR) is the CKD-EPI equation.        eGFR if    Date Value Ref Range Status   12/04/2019 >60 >60 mL/min/1.73 m^2 Final   10/16/2019 >60 >60 mL/min/1.73 m^2 Final   10/15/2019 >60 >60 mL/min/1.73 m^2 Final               Appointments  past 12m or future 3m with PCP    Date Provider   Last Visit   10/29/2019 Shamir Eubanks MD   Next Visit   Visit date not found Shamir Eubanks MD   .  ED visits in past 90 days: [unfilled]       Note composed:8:34 AM 04/23/2020

## 2020-05-13 ENCOUNTER — PATIENT OUTREACH (OUTPATIENT)
Dept: OTHER | Facility: OTHER | Age: 45
End: 2020-05-13

## 2020-05-13 DIAGNOSIS — I10 HYPERTENSION, ESSENTIAL: Primary | ICD-10-CM

## 2020-05-21 DIAGNOSIS — Z01.84 ANTIBODY RESPONSE EXAMINATION: ICD-10-CM

## 2020-05-25 ENCOUNTER — PATIENT OUTREACH (OUTPATIENT)
Dept: OTHER | Facility: OTHER | Age: 45
End: 2020-05-25

## 2020-05-25 ENCOUNTER — PATIENT MESSAGE (OUTPATIENT)
Dept: ADMINISTRATIVE | Facility: OTHER | Age: 45
End: 2020-05-25

## 2020-06-17 RX ORDER — HYDROCHLOROTHIAZIDE 12.5 MG/1
12.5 TABLET ORAL DAILY
Qty: 90 TABLET | Refills: 1 | Status: SHIPPED | OUTPATIENT
Start: 2020-06-17 | End: 2021-01-20 | Stop reason: SDUPTHER

## 2020-06-17 NOTE — PROGRESS NOTES
Digital Medicine: Clinician Follow-Up    Routine outreach.     Patient reports doing well.     Reviewed readings with patient. Appropriate and controlled with some mild elevations and mild lows. Patient denies s/s of hypotension.     Agreeable to update labs and monitor potassium. Order entered for BMP. Renewed HCTZ.     Will follow-up after labs. Will continue current regimen.     The history is provided by the patient.   Follow Up  Follow-up reason(s): reading review          INTERVENTION(S)  encouragement/support    PLAN  patient verbalizes understanding and additional monitoring needed          Topic    Lipid (Cholesterol) Test        Last 5 Patient Entered Readings                                      Current 30 Day Average: 111/84     Recent Readings 6/12/2020 6/4/2020 6/4/2020 6/4/2020 6/3/2020    SBP (mmHg) 115 111 104 138 99    DBP (mmHg) 82 80 85 93 75    Pulse 72 72 84 66 70             Hypertension Medications             amLODIPine (NORVASC) 10 MG tablet Take 1 tablet (10 mg total) by mouth once daily.    hydroCHLOROthiazide (HYDRODIURIL) 12.5 MG Tab TAKE 1 TABLET BY MOUTH EVERY DAY    valsartan (DIOVAN) 80 MG tablet Take 1 tablet (80 mg total) by mouth once daily.                 Screenings

## 2020-06-20 DIAGNOSIS — Z01.84 ANTIBODY RESPONSE EXAMINATION: ICD-10-CM

## 2020-06-26 ENCOUNTER — LAB VISIT (OUTPATIENT)
Dept: LAB | Facility: HOSPITAL | Age: 45
End: 2020-06-26
Attending: FAMILY MEDICINE
Payer: COMMERCIAL

## 2020-06-26 DIAGNOSIS — I10 HYPERTENSION, ESSENTIAL: ICD-10-CM

## 2020-06-26 LAB
ANION GAP SERPL CALC-SCNC: 7 MMOL/L (ref 8–16)
BUN SERPL-MCNC: 13 MG/DL (ref 6–20)
CALCIUM SERPL-MCNC: 9.4 MG/DL (ref 8.7–10.5)
CHLORIDE SERPL-SCNC: 104 MMOL/L (ref 95–110)
CO2 SERPL-SCNC: 28 MMOL/L (ref 23–29)
CREAT SERPL-MCNC: 0.9 MG/DL (ref 0.5–1.4)
EST. GFR  (AFRICAN AMERICAN): >60 ML/MIN/1.73 M^2
EST. GFR  (NON AFRICAN AMERICAN): >60 ML/MIN/1.73 M^2
GLUCOSE SERPL-MCNC: 96 MG/DL (ref 70–110)
POTASSIUM SERPL-SCNC: 3.7 MMOL/L (ref 3.5–5.1)
SODIUM SERPL-SCNC: 139 MMOL/L (ref 136–145)

## 2020-06-26 PROCEDURE — 36415 COLL VENOUS BLD VENIPUNCTURE: CPT | Mod: PO

## 2020-06-26 PROCEDURE — 80048 BASIC METABOLIC PNL TOTAL CA: CPT

## 2020-07-20 DIAGNOSIS — Z01.84 ANTIBODY RESPONSE EXAMINATION: ICD-10-CM

## 2020-08-13 NOTE — PROGRESS NOTES
"Digital Medicine: Health  Follow-Up    The history is provided by the patient.                     Topics Covered on Call: physical activity and Diet    Additional Follow-up details: Patient reports that she's been doing " good ". She explained that she typically picks up her medications from [facility] sorted in a daily pack that's ready to take with all of her medications but that the pharmacy failed to tell her that she would be getting her blood pressure meds through the mail this time so she contributes her high blood pressure readings from the beginning of the month due to not taking her meds as usual. She reports being back on track with all of her medication and she has noticed her blood pressure is starting to trend back down.        Diet-no change to diet    No change to diet.  Patient reports eating or drinking the following: Continues to limit her calorie and sodium intake.      Physical Activity-no change to routine  No change to exercise routine.       Additional physical activity details: Continues with regular exercise. She discussed that she also does arm weights while sitting at her desk working. She said that she likes the feeling of " soreness" from doing weights and considers it a sign to increase the repetitions.               Addressed any questions or concerns and patient has my contact information if needed prior to next outreach.   Explained the importance of self-monitoring and medication adherence. Encouraged the patient to communicate with their health  for lifestyle modifications to help improve or maintain a healthy lifestyle.                Topic    Lipid (Cholesterol) Test        Last 5 Patient Entered Readings                                      Current 30 Day Average: 118/89     Recent Readings 8/9/2020 8/9/2020 8/8/2020 8/7/2020 8/7/2020    SBP (mmHg) 131 116 116 142 147    DBP (mmHg) 82 91 90 103 100    Pulse 81 80 74 83 81               "

## 2020-08-19 DIAGNOSIS — Z01.84 ANTIBODY RESPONSE EXAMINATION: ICD-10-CM

## 2020-09-18 DIAGNOSIS — Z01.84 ANTIBODY RESPONSE EXAMINATION: ICD-10-CM

## 2020-09-24 ENCOUNTER — PATIENT OUTREACH (OUTPATIENT)
Dept: OTHER | Facility: OTHER | Age: 45
End: 2020-09-24

## 2020-09-24 NOTE — PROGRESS NOTES
"Digital Medicine: Health  Follow-Up    The history is provided by the patient.             Reason for review: Blood pressure at goal        Topics Covered on Call: physical activity and Diet    Additional Follow-up details: Patient had a few moments for a check in as she was waiting to start a conference call.     She feels well. She discussed that on 9/21 she checked her blood pressure and it was 118/86. She said that the reading seemed high and she realized that she checked it right after moving some things around, so she sat to rest for a few minutes and it came down to 113/82.          Diet-no change to diet  No 24 hour dietary recall  No change to diet.  Patient reports eating or drinking the following:  cooks with mostly fresh seasonings. No salt.  She does not add salt to table food.  She reads food labels for sodium content.    Drinks " a lot " of water.       Physical Activity-no change to routine  No change to exercise routine.       Additional physical activity details: Jump rope  In place exercises 15 repetations- arm lift motion, chain breaker, rotation               Continue current diet/physical activity routine.  Instructed to charge device.  Reviewed Device Techniques.     Addressed patient questions and patient has my contact information if needed prior to next outreach. Patient verbalizes understanding.      Explained the importance of self-monitoring and medication adherence. Encouraged the patient to communicate with their health  for lifestyle modifications to help improve or maintain a healthy lifestyle.                Topic    Lipid (Cholesterol) Test        Last 5 Patient Entered Readings                                      Current 30 Day Average: 110/81     Recent Readings 9/22/2020 9/22/2020 9/14/2020 9/4/2020 8/26/2020    SBP (mmHg) 113 118 113 106 108    DBP (mmHg) 82 86 78 75 82    Pulse 74 78 75 74 78               "

## 2020-10-05 DIAGNOSIS — I10 HYPERTENSION, ESSENTIAL: ICD-10-CM

## 2020-10-05 NOTE — TELEPHONE ENCOUNTER
Care Due:                  Date            Visit Type   Department     Provider  --------------------------------------------------------------------------------                                HOSPITAL     [facility] INTERNAL  Last Visit: 10-      FOLLOW UP    MEDICINE       KISHORE SONI  Next Visit: None Scheduled  None         None Found                                                            Last  Test          Frequency    Reason                     Performed    Due Date  --------------------------------------------------------------------------------    Office Visit  12 months..  hydroCHLOROthiazide,       10-   10-                             potassium, valsartan.....    Cr..........  6 months...  hydroCHLOROthiazide,       06- 12-                             potassium................    K...........  6 months...  hydroCHLOROthiazide,       06- 12-                             potassium................    Na..........  6 months...  hydroCHLOROthiazide......  06- 12-    Powered by [employer]. Reference number: [id number]. 10/05/2020 1:49:09 PM   CDT

## 2020-10-06 NOTE — PROGRESS NOTES
Refill Routing Note   Medication(s) are not appropriate for processing by [facility] for the following reason(s):     - Required vitals are abnormal    [facility] actions taken in this encounter: Defer       Medication Therapy Plan: [facility]  Medication reconciliation completed: No   Automatic Epic Generated Protocol Data:        Requested Prescriptions   Pending Prescriptions Disp Refills    valsartan (DIOVAN) 80 MG tablet [Pharmacy Med Name: VALSARTAN 80 MG TABLET] 90 tablet 0     Sig: TAKE 1 TABLET BY MOUTH EVERY DAY       Cardiovascular:  Angiotensin Receptor Blockers Failed - 10/5/2020  1:48 PM        Failed - Last BP in normal range within 360 days     BP Readings from Last 3 Encounters:   01/31/20 (!) 130/90   01/13/20 (!) 153/97   12/30/19 137/87              Passed - Patient is at least 18 years old        Passed - Negative Pregnancy Status Check        Passed - Office Visit within last 12 months or future 90 days.     Recent Outpatient Visits            8 months ago Post-operative state    [facility] 5th Fl Emmanuelle Davies MD    8 months ago Urinary frequency    [facility] Jessica Beckwith NP    9 months ago S/P bladder repair    [facility] Jessica Beckwith NP    10 months ago Pre-op exam    [facility] Emmanuelle Davies MD    11 months ago Symptomatic anemia    [facility] Shamir Eubanks MD                    Passed - Cr is 1.3 or below and within 360 days     Creatinine   Date Value Ref Range Status   06/26/2020 0.9 0.5 - 1.4 mg/dL Final   12/04/2019 0.8 0.5 - 1.4 mg/dL Final   10/16/2019 0.8 0.5 - 1.4 mg/dL Final              Passed - K in normal range and within 360 days     Potassium   Date Value Ref Range Status   06/26/2020 3.7 3.5 - 5.1 mmol/L Final   01/13/2020 3.7 3.5 - 5.1 mmol/L Final   12/17/2019 3.3 (L) 3.5 - 5.1 mmol/L Final              Passed - eGFR within 360 days     eGFR if non     Date Value Ref Range Status   06/26/2020 >60.0 >60 mL/min/1.73 m^2 Final     Comment:     Calculation used to obtain the estimated glomerular filtration  rate (eGFR) is the CKD-EPI equation.      12/04/2019 >60 >60 mL/min/1.73 m^2 Final     Comment:     Calculation used to obtain the estimated glomerular filtration  rate (eGFR) is the CKD-EPI equation.      10/16/2019 >60 >60 mL/min/1.73 m^2 Final     Comment:     Calculation used to obtain the estimated glomerular filtration  rate (eGFR) is the CKD-EPI equation.        eGFR if    Date Value Ref Range Status   06/26/2020 >60.0 >60 mL/min/1.73 m^2 Final   12/04/2019 >60 >60 mL/min/1.73 m^2 Final   10/16/2019 >60 >60 mL/min/1.73 m^2 Final                    Appointments  past 12m or future 3m with PCP    Date Provider   Last Visit   10/29/2019 Shamir Eubanks MD   Next Visit   Visit date not found Shamir Eubanks MD   ED visits in past 90 days: 0        Note composed:6:14 PM 10/06/2020

## 2020-10-07 RX ORDER — VALSARTAN 80 MG/1
TABLET ORAL
Qty: 90 TABLET | Refills: 0 | Status: SHIPPED | OUTPATIENT
Start: 2020-10-07 | End: 2021-01-05

## 2020-10-18 DIAGNOSIS — Z01.84 ANTIBODY RESPONSE EXAMINATION: ICD-10-CM

## 2020-10-19 ENCOUNTER — PATIENT MESSAGE (OUTPATIENT)
Dept: ADMINISTRATIVE | Facility: OTHER | Age: 45
End: 2020-10-19

## 2020-10-20 ENCOUNTER — PATIENT MESSAGE (OUTPATIENT)
Dept: ADMINISTRATIVE | Facility: OTHER | Age: 45
End: 2020-10-20

## 2020-10-20 DIAGNOSIS — I10 HYPERTENSION, ESSENTIAL: Primary | ICD-10-CM

## 2020-10-20 RX ORDER — AMLODIPINE BESYLATE 10 MG/1
10 TABLET ORAL DAILY
Qty: 90 TABLET | Refills: 1 | Status: SHIPPED | OUTPATIENT
Start: 2020-10-20 | End: 2021-03-05 | Stop reason: SDUPTHER

## 2020-10-28 ENCOUNTER — PATIENT OUTREACH (OUTPATIENT)
Dept: OTHER | Facility: OTHER | Age: 45
End: 2020-10-28

## 2020-10-28 NOTE — PROGRESS NOTES
Digital Medicine: Health  Follow-Up    The history is provided by the patient.             Reason for review: Blood pressure at goal        Topics Covered on Call: physical activity and Diet    Additional Follow-up details: Patient feels well.     She takes her medication every morning.     She checks her blood pressure about an hour after taking her medicine.             Diet-no change to diet  24 hour dietary recall  No change to diet.  Breakfast is typically between. Grits.  Lunch is typically between. Grilled hamburger.   Dinner is typically between. White beans + beef sausage .   Patient reports eating or drinking the following: She drinks mostly water.  She eats fruit  She reads food labels for sodium content- she avoids processed/packaged food      Physical Activity-no change to routine  No change to exercise routine.       Additional physical activity details: Exercises daily.        Medication Adherence-Medication adherence was assessed.          Continue current diet/physical activity routine.       Addressed patient questions and patient has my contact information if needed prior to next outreach. Patient verbalizes understanding.      Explained the importance of self-monitoring and medication adherence. Encouraged the patient to communicate with their health  for lifestyle modifications to help improve or maintain a healthy lifestyle.                   Topic    Lipid (Cholesterol) Test          Last 5 Patient Entered Readings                                      Current 30 Day Average: 112/82     Recent Readings 10/22/2020 10/13/2020 10/12/2020 10/7/2020 9/30/2020    SBP (mmHg) 103 128 113 109 105    DBP (mmHg) 77 85 87 79 84    Pulse 70 88 78 85 88

## 2020-10-30 ENCOUNTER — PATIENT OUTREACH (OUTPATIENT)
Dept: OTHER | Facility: OTHER | Age: 45
End: 2020-10-30

## 2020-10-30 NOTE — PROGRESS NOTES
Digital Medicine: Clinician Follow-Up    Patient reports doing well. Denies issues or concerns at this time.         The history is provided by the patient.   Follow-up reason(s): routine follow up.     Hypertension    Patient's blood pressure is stable.         Last 5 Patient Entered Readings                                      Current 30 Day Average: 112/82     Recent Readings 10/22/2020 10/13/2020 10/12/2020 10/7/2020 9/30/2020    SBP (mmHg) 103 128 113 109 105    DBP (mmHg) 77 85 87 79 84    Pulse 70 88 78 85 88                 Depression Screening  Did not address depression screening.    Sleep Apnea Screening    Did not address sleep apnea screening.     Medication Affordability Screening  Did not address medication affordability screening.     Medication Adherence-Medication adherence was assessed.          ASSESSMENT(S)  Patients BP average is 112/82 mmHg, which is at goal. Patient's BP goal is less than or equal to 130/80.    Continue to see some mild variability in DBPs suspect related to diet. Patient discussed with HC earlier this week.     Will plan to monitor for improved DBP stability and reach out based on control.       Hypertension Plan  Continue current therapy.  Will continue to monitor and reach out as needed.      Addressed patient questions and patient has my contact information if needed prior to next outreach. Patient verbalizes understanding.                 Topic    Lipid (Cholesterol) Test      There are no preventive care reminders to display for this patient.      Hypertension Medications             amLODIPine (NORVASC) 10 MG tablet Take 1 tablet (10 mg total) by mouth once daily.    hydroCHLOROthiazide (HYDRODIURIL) 12.5 MG Tab TAKE 1 TABLET BY MOUTH EVERY DAY    hydroCHLOROthiazide (HYDRODIURIL) 12.5 MG Tab Take 1 tablet (12.5 mg total) by mouth once daily.    valsartan (DIOVAN) 80 MG tablet TAKE 1 TABLET BY MOUTH EVERY DAY

## 2020-11-17 DIAGNOSIS — Z01.84 ANTIBODY RESPONSE EXAMINATION: ICD-10-CM

## 2020-12-02 ENCOUNTER — PATIENT OUTREACH (OUTPATIENT)
Dept: OTHER | Facility: OTHER | Age: 45
End: 2020-12-02

## 2021-01-04 ENCOUNTER — PATIENT MESSAGE (OUTPATIENT)
Dept: ADMINISTRATIVE | Facility: HOSPITAL | Age: 46
End: 2021-01-04

## 2021-01-31 DIAGNOSIS — I10 HYPERTENSION, ESSENTIAL: ICD-10-CM

## 2021-02-02 RX ORDER — POTASSIUM CHLORIDE 750 MG/1
CAPSULE, EXTENDED RELEASE ORAL
Qty: 180 CAPSULE | Refills: 0 | Status: SHIPPED | OUTPATIENT
Start: 2021-02-02 | End: 2021-06-09 | Stop reason: SDUPTHER

## 2021-02-02 RX ORDER — VALSARTAN 80 MG/1
TABLET ORAL
Qty: 90 TABLET | Refills: 0 | Status: SHIPPED | OUTPATIENT
Start: 2021-02-02 | End: 2021-03-05 | Stop reason: SDUPTHER

## 2021-02-04 NOTE — PLAN OF CARE
Patient prefers to have Greg Zhu, spouse, present for discharge.   Zach Inman is a 62year old female. Patient presents with:  Establish Care: Referral for Ortho and Colonoscopy   Physical    HPI:   49-year-old female with a past medical history of exercise-induced asthma, fibroids status post hysterectomy here t for vomiting, abdominal pain and abdominal distention. Genitourinary: Negative for hematuria. Musculoskeletal: Positive for joint pain. Skin: Negative for wound. Psychiatric/Behavioral: Negative for behavioral problems.      Wt Readings from Last 5 Future    3. Screen for colon cancer    - GASTRO - INTERNAL    4. Meniscal injury, left, sequela  Referral given for orthopedics.   Instructed patient to get images in a CD when she goes for orthopedic appointment  - ORTHOPEDIC - INTERNAL    5. Medication r

## 2021-02-15 ENCOUNTER — TELEPHONE (OUTPATIENT)
Dept: INTERNAL MEDICINE | Facility: CLINIC | Age: 46
End: 2021-02-15

## 2021-02-15 DIAGNOSIS — D64.9 ANEMIA, UNSPECIFIED TYPE: Primary | ICD-10-CM

## 2021-02-15 DIAGNOSIS — R74.8 ELEVATED ALKALINE PHOSPHATASE LEVEL: ICD-10-CM

## 2021-03-01 ENCOUNTER — PATIENT OUTREACH (OUTPATIENT)
Dept: ADMINISTRATIVE | Facility: OTHER | Age: 46
End: 2021-03-01

## 2021-03-01 ENCOUNTER — OFFICE VISIT (OUTPATIENT)
Dept: GASTROENTEROLOGY | Facility: CLINIC | Age: 46
End: 2021-03-01
Payer: COMMERCIAL

## 2021-03-01 VITALS
WEIGHT: 206.5 LBS | HEART RATE: 86 BPM | DIASTOLIC BLOOD PRESSURE: 80 MMHG | OXYGEN SATURATION: 99 % | SYSTOLIC BLOOD PRESSURE: 128 MMHG | BODY MASS INDEX: 38 KG/M2 | RESPIRATION RATE: 16 BRPM | TEMPERATURE: 98 F | HEIGHT: 62 IN

## 2021-03-01 DIAGNOSIS — Z01.818 PREOPERATIVE EXAMINATION: ICD-10-CM

## 2021-03-01 DIAGNOSIS — Z12.31 ENCOUNTER FOR SCREENING MAMMOGRAM FOR MALIGNANT NEOPLASM OF BREAST: Primary | ICD-10-CM

## 2021-03-01 DIAGNOSIS — Z12.11 SCREENING FOR MALIGNANT NEOPLASM OF COLON: Primary | ICD-10-CM

## 2021-03-01 PROCEDURE — 1126F AMNT PAIN NOTED NONE PRSNT: CPT | Mod: S$GLB,,, | Performed by: NURSE PRACTITIONER

## 2021-03-01 PROCEDURE — 3074F PR MOST RECENT SYSTOLIC BLOOD PRESSURE < 130 MM HG: ICD-10-PCS | Mod: CPTII,S$GLB,, | Performed by: NURSE PRACTITIONER

## 2021-03-01 PROCEDURE — 99203 PR OFFICE/OUTPT VISIT, NEW, LEVL III, 30-44 MIN: ICD-10-PCS | Mod: S$GLB,,, | Performed by: NURSE PRACTITIONER

## 2021-03-01 PROCEDURE — 99203 OFFICE O/P NEW LOW 30 MIN: CPT | Mod: S$GLB,,, | Performed by: NURSE PRACTITIONER

## 2021-03-01 PROCEDURE — 3008F BODY MASS INDEX DOCD: CPT | Mod: CPTII,S$GLB,, | Performed by: NURSE PRACTITIONER

## 2021-03-01 PROCEDURE — 99999 PR PBB SHADOW E&M-EST. PATIENT-LVL IV: ICD-10-PCS | Mod: PBBFAC,,, | Performed by: NURSE PRACTITIONER

## 2021-03-01 PROCEDURE — 3074F SYST BP LT 130 MM HG: CPT | Mod: CPTII,S$GLB,, | Performed by: NURSE PRACTITIONER

## 2021-03-01 PROCEDURE — 3079F DIAST BP 80-89 MM HG: CPT | Mod: CPTII,S$GLB,, | Performed by: NURSE PRACTITIONER

## 2021-03-01 PROCEDURE — 3079F PR MOST RECENT DIASTOLIC BLOOD PRESSURE 80-89 MM HG: ICD-10-PCS | Mod: CPTII,S$GLB,, | Performed by: NURSE PRACTITIONER

## 2021-03-01 PROCEDURE — 99999 PR PBB SHADOW E&M-EST. PATIENT-LVL IV: CPT | Mod: PBBFAC,,, | Performed by: NURSE PRACTITIONER

## 2021-03-01 PROCEDURE — 3008F PR BODY MASS INDEX (BMI) DOCUMENTED: ICD-10-PCS | Mod: CPTII,S$GLB,, | Performed by: NURSE PRACTITIONER

## 2021-03-01 PROCEDURE — 1126F PR PAIN SEVERITY QUANTIFIED, NO PAIN PRESENT: ICD-10-PCS | Mod: S$GLB,,, | Performed by: NURSE PRACTITIONER

## 2021-03-01 RX ORDER — FERROUS SULFATE 325(65) MG
325 TABLET ORAL
Qty: 90 TABLET | Refills: 0 | COMMUNITY
Start: 2021-03-01 | End: 2021-03-12 | Stop reason: SDUPTHER

## 2021-03-01 RX ORDER — SODIUM, POTASSIUM,MAG SULFATES 17.5-3.13G
1 SOLUTION, RECONSTITUTED, ORAL ORAL DAILY
Qty: 354 ML | Refills: 0 | Status: SHIPPED | OUTPATIENT
Start: 2021-03-01 | End: 2021-03-12

## 2021-03-03 PROBLEM — Z12.11 SCREENING FOR MALIGNANT NEOPLASM OF COLON: Status: ACTIVE | Noted: 2021-03-03

## 2021-03-05 ENCOUNTER — HOSPITAL ENCOUNTER (OUTPATIENT)
Dept: RADIOLOGY | Facility: HOSPITAL | Age: 46
Discharge: HOME | End: 2021-03-05
Attending: FAMILY MEDICINE
Payer: COMMERCIAL

## 2021-03-05 ENCOUNTER — OFFICE VISIT (OUTPATIENT)
Dept: INTERNAL MEDICINE | Facility: CLINIC | Age: 46
End: 2021-03-05
Attending: FAMILY MEDICINE
Payer: COMMERCIAL

## 2021-03-05 VITALS
WEIGHT: 203.81 LBS | SYSTOLIC BLOOD PRESSURE: 110 MMHG | DIASTOLIC BLOOD PRESSURE: 80 MMHG | OXYGEN SATURATION: 99 % | BODY MASS INDEX: 37.51 KG/M2 | RESPIRATION RATE: 18 BRPM | HEART RATE: 76 BPM | TEMPERATURE: 97 F | HEIGHT: 62 IN

## 2021-03-05 DIAGNOSIS — Z00.00 ANNUAL PHYSICAL EXAM: Primary | ICD-10-CM

## 2021-03-05 DIAGNOSIS — I10 HYPERTENSION, ESSENTIAL: ICD-10-CM

## 2021-03-05 DIAGNOSIS — Z12.31 ENCOUNTER FOR SCREENING MAMMOGRAM FOR MALIGNANT NEOPLASM OF BREAST: ICD-10-CM

## 2021-03-05 DIAGNOSIS — R74.8 ELEVATED ALKALINE PHOSPHATASE LEVEL: ICD-10-CM

## 2021-03-05 DIAGNOSIS — D64.9 ANEMIA, UNSPECIFIED TYPE: ICD-10-CM

## 2021-03-05 DIAGNOSIS — Z12.11 COLON CANCER SCREENING: ICD-10-CM

## 2021-03-05 PROCEDURE — 3074F PR MOST RECENT SYSTOLIC BLOOD PRESSURE < 130 MM HG: ICD-10-PCS | Mod: CPTII,S$GLB,, | Performed by: FAMILY MEDICINE

## 2021-03-05 PROCEDURE — 77063 MAMMO DIGITAL SCREENING BILAT WITH TOMO: ICD-10-PCS | Mod: 26,,, | Performed by: RADIOLOGY

## 2021-03-05 PROCEDURE — 3079F DIAST BP 80-89 MM HG: CPT | Mod: CPTII,S$GLB,, | Performed by: FAMILY MEDICINE

## 2021-03-05 PROCEDURE — 77067 SCR MAMMO BI INCL CAD: CPT | Mod: 26,,, | Performed by: RADIOLOGY

## 2021-03-05 PROCEDURE — 3079F PR MOST RECENT DIASTOLIC BLOOD PRESSURE 80-89 MM HG: ICD-10-PCS | Mod: CPTII,S$GLB,, | Performed by: FAMILY MEDICINE

## 2021-03-05 PROCEDURE — 1126F PR PAIN SEVERITY QUANTIFIED, NO PAIN PRESENT: ICD-10-PCS | Mod: S$GLB,,, | Performed by: FAMILY MEDICINE

## 2021-03-05 PROCEDURE — 3008F PR BODY MASS INDEX (BMI) DOCUMENTED: ICD-10-PCS | Mod: CPTII,S$GLB,, | Performed by: FAMILY MEDICINE

## 2021-03-05 PROCEDURE — 77063 BREAST TOMOSYNTHESIS BI: CPT | Mod: 26,,, | Performed by: RADIOLOGY

## 2021-03-05 PROCEDURE — 77067 SCR MAMMO BI INCL CAD: CPT | Mod: TC

## 2021-03-05 PROCEDURE — 99999 PR PBB SHADOW E&M-EST. PATIENT-LVL IV: ICD-10-PCS | Mod: PBBFAC,,, | Performed by: FAMILY MEDICINE

## 2021-03-05 PROCEDURE — 3008F BODY MASS INDEX DOCD: CPT | Mod: CPTII,S$GLB,, | Performed by: FAMILY MEDICINE

## 2021-03-05 PROCEDURE — 99396 PREV VISIT EST AGE 40-64: CPT | Mod: S$GLB,,, | Performed by: FAMILY MEDICINE

## 2021-03-05 PROCEDURE — 3074F SYST BP LT 130 MM HG: CPT | Mod: CPTII,S$GLB,, | Performed by: FAMILY MEDICINE

## 2021-03-05 PROCEDURE — 1126F AMNT PAIN NOTED NONE PRSNT: CPT | Mod: S$GLB,,, | Performed by: FAMILY MEDICINE

## 2021-03-05 PROCEDURE — 99999 PR PBB SHADOW E&M-EST. PATIENT-LVL IV: CPT | Mod: PBBFAC,,, | Performed by: FAMILY MEDICINE

## 2021-03-05 PROCEDURE — 99396 PR PREVENTIVE VISIT,EST,40-64: ICD-10-PCS | Mod: S$GLB,,, | Performed by: FAMILY MEDICINE

## 2021-03-05 PROCEDURE — 77067 MAMMO DIGITAL SCREENING BILAT WITH TOMO: ICD-10-PCS | Mod: 26,,, | Performed by: RADIOLOGY

## 2021-03-05 RX ORDER — HYDROCHLOROTHIAZIDE 12.5 MG/1
12.5 TABLET ORAL DAILY
Qty: 90 TABLET | Refills: 3 | Status: SHIPPED | OUTPATIENT
Start: 2021-03-05 | End: 2022-04-11 | Stop reason: SDUPTHER

## 2021-03-05 RX ORDER — AMLODIPINE BESYLATE 10 MG/1
10 TABLET ORAL DAILY
Qty: 90 TABLET | Refills: 3 | Status: SHIPPED | OUTPATIENT
Start: 2021-03-05 | End: 2022-04-11 | Stop reason: SDUPTHER

## 2021-03-05 RX ORDER — VALSARTAN 80 MG/1
80 TABLET ORAL DAILY
Qty: 90 TABLET | Refills: 3 | Status: SHIPPED | OUTPATIENT
Start: 2021-03-05 | End: 2022-04-11 | Stop reason: SDUPTHER

## 2021-03-12 RX ORDER — FERROUS SULFATE 325(65) MG
325 TABLET ORAL
Qty: 90 TABLET | Refills: 0 | Status: SHIPPED | OUTPATIENT
Start: 2021-03-12 | End: 2022-03-18 | Stop reason: SDUPTHER

## 2021-04-06 ENCOUNTER — TELEPHONE (OUTPATIENT)
Dept: GASTROENTEROLOGY | Facility: CLINIC | Age: 46
End: 2021-04-06

## 2021-04-15 ENCOUNTER — PATIENT MESSAGE (OUTPATIENT)
Dept: RESEARCH | Facility: HOSPITAL | Age: 46
End: 2021-04-15

## 2021-04-15 ENCOUNTER — PATIENT MESSAGE (OUTPATIENT)
Dept: OTHER | Facility: OTHER | Age: 46
End: 2021-04-15

## 2021-04-27 ENCOUNTER — PATIENT MESSAGE (OUTPATIENT)
Dept: ADMINISTRATIVE | Facility: OTHER | Age: 46
End: 2021-04-27

## 2021-04-30 ENCOUNTER — PATIENT MESSAGE (OUTPATIENT)
Dept: INTERNAL MEDICINE | Facility: CLINIC | Age: 46
End: 2021-04-30

## 2021-04-30 ENCOUNTER — TELEPHONE (OUTPATIENT)
Dept: INTERNAL MEDICINE | Facility: CLINIC | Age: 46
End: 2021-04-30

## 2021-04-30 DIAGNOSIS — K80.20 CALCULUS OF GALLBLADDER WITHOUT CHOLECYSTITIS WITHOUT OBSTRUCTION: Primary | ICD-10-CM

## 2021-05-04 ENCOUNTER — TELEPHONE (OUTPATIENT)
Dept: INTERNAL MEDICINE | Facility: CLINIC | Age: 46
End: 2021-05-04

## 2021-05-06 ENCOUNTER — TELEPHONE (OUTPATIENT)
Dept: INTERNAL MEDICINE | Facility: CLINIC | Age: 46
End: 2021-05-06

## 2021-05-18 ENCOUNTER — TELEPHONE (OUTPATIENT)
Dept: INTERNAL MEDICINE | Facility: CLINIC | Age: 46
End: 2021-05-18

## 2021-05-18 ENCOUNTER — TELEPHONE (OUTPATIENT)
Dept: ORTHOPEDICS | Facility: CLINIC | Age: 46
End: 2021-05-18

## 2021-05-18 ENCOUNTER — OFFICE VISIT (OUTPATIENT)
Dept: ORTHOPEDICS | Facility: CLINIC | Age: 46
End: 2021-05-18
Payer: COMMERCIAL

## 2021-05-18 VITALS — HEIGHT: 62 IN | WEIGHT: 203.69 LBS | BODY MASS INDEX: 37.48 KG/M2

## 2021-05-18 DIAGNOSIS — M79.89 RIGHT LEG SWELLING: ICD-10-CM

## 2021-05-18 DIAGNOSIS — M79.89 LEFT LEG SWELLING: Primary | ICD-10-CM

## 2021-05-18 PROCEDURE — 1125F PR PAIN SEVERITY QUANTIFIED, PAIN PRESENT: ICD-10-PCS | Mod: S$GLB,,, | Performed by: ORTHOPAEDIC SURGERY

## 2021-05-18 PROCEDURE — 99202 OFFICE O/P NEW SF 15 MIN: CPT | Mod: S$GLB,,, | Performed by: ORTHOPAEDIC SURGERY

## 2021-05-18 PROCEDURE — 3008F PR BODY MASS INDEX (BMI) DOCUMENTED: ICD-10-PCS | Mod: CPTII,S$GLB,, | Performed by: ORTHOPAEDIC SURGERY

## 2021-05-18 PROCEDURE — 99202 PR OFFICE/OUTPT VISIT, NEW, LEVL II, 15-29 MIN: ICD-10-PCS | Mod: S$GLB,,, | Performed by: ORTHOPAEDIC SURGERY

## 2021-05-18 PROCEDURE — 1125F AMNT PAIN NOTED PAIN PRSNT: CPT | Mod: S$GLB,,, | Performed by: ORTHOPAEDIC SURGERY

## 2021-05-18 PROCEDURE — 99999 PR PBB SHADOW E&M-EST. PATIENT-LVL III: CPT | Mod: PBBFAC,,, | Performed by: ORTHOPAEDIC SURGERY

## 2021-05-18 PROCEDURE — 99999 PR PBB SHADOW E&M-EST. PATIENT-LVL III: ICD-10-PCS | Mod: PBBFAC,,, | Performed by: ORTHOPAEDIC SURGERY

## 2021-05-18 PROCEDURE — 3008F BODY MASS INDEX DOCD: CPT | Mod: CPTII,S$GLB,, | Performed by: ORTHOPAEDIC SURGERY

## 2021-05-24 ENCOUNTER — PATIENT MESSAGE (OUTPATIENT)
Dept: INTERNAL MEDICINE | Facility: CLINIC | Age: 46
End: 2021-05-24

## 2021-05-26 ENCOUNTER — OFFICE VISIT (OUTPATIENT)
Dept: FAMILY MEDICINE | Facility: CLINIC | Age: 46
End: 2021-05-26
Attending: FAMILY MEDICINE
Payer: COMMERCIAL

## 2021-05-26 VITALS
TEMPERATURE: 98 F | DIASTOLIC BLOOD PRESSURE: 72 MMHG | SYSTOLIC BLOOD PRESSURE: 120 MMHG | WEIGHT: 206 LBS | HEIGHT: 62 IN | HEART RATE: 86 BPM | OXYGEN SATURATION: 97 % | RESPIRATION RATE: 18 BRPM | BODY MASS INDEX: 37.91 KG/M2

## 2021-05-26 DIAGNOSIS — I10 HYPERTENSION, ESSENTIAL: Primary | ICD-10-CM

## 2021-05-26 DIAGNOSIS — D50.9 IRON DEFICIENCY ANEMIA, UNSPECIFIED IRON DEFICIENCY ANEMIA TYPE: ICD-10-CM

## 2021-05-26 DIAGNOSIS — R60.9 EDEMA, UNSPECIFIED TYPE: ICD-10-CM

## 2021-05-26 PROCEDURE — 3074F PR MOST RECENT SYSTOLIC BLOOD PRESSURE < 130 MM HG: ICD-10-PCS | Mod: CPTII,S$GLB,, | Performed by: FAMILY MEDICINE

## 2021-05-26 PROCEDURE — 3078F PR MOST RECENT DIASTOLIC BLOOD PRESSURE < 80 MM HG: ICD-10-PCS | Mod: CPTII,S$GLB,, | Performed by: FAMILY MEDICINE

## 2021-05-26 PROCEDURE — 1126F AMNT PAIN NOTED NONE PRSNT: CPT | Mod: S$GLB,,, | Performed by: FAMILY MEDICINE

## 2021-05-26 PROCEDURE — 3008F PR BODY MASS INDEX (BMI) DOCUMENTED: ICD-10-PCS | Mod: CPTII,S$GLB,, | Performed by: FAMILY MEDICINE

## 2021-05-26 PROCEDURE — 1126F PR PAIN SEVERITY QUANTIFIED, NO PAIN PRESENT: ICD-10-PCS | Mod: S$GLB,,, | Performed by: FAMILY MEDICINE

## 2021-05-26 PROCEDURE — 99999 PR PBB SHADOW E&M-EST. PATIENT-LVL IV: ICD-10-PCS | Mod: PBBFAC,,, | Performed by: FAMILY MEDICINE

## 2021-05-26 PROCEDURE — 3074F SYST BP LT 130 MM HG: CPT | Mod: CPTII,S$GLB,, | Performed by: FAMILY MEDICINE

## 2021-05-26 PROCEDURE — 3008F BODY MASS INDEX DOCD: CPT | Mod: CPTII,S$GLB,, | Performed by: FAMILY MEDICINE

## 2021-05-26 PROCEDURE — 99214 OFFICE O/P EST MOD 30 MIN: CPT | Mod: S$GLB,,, | Performed by: FAMILY MEDICINE

## 2021-05-26 PROCEDURE — 99999 PR PBB SHADOW E&M-EST. PATIENT-LVL IV: CPT | Mod: PBBFAC,,, | Performed by: FAMILY MEDICINE

## 2021-05-26 PROCEDURE — 99214 PR OFFICE/OUTPT VISIT, EST, LEVL IV, 30-39 MIN: ICD-10-PCS | Mod: S$GLB,,, | Performed by: FAMILY MEDICINE

## 2021-05-26 PROCEDURE — 3078F DIAST BP <80 MM HG: CPT | Mod: CPTII,S$GLB,, | Performed by: FAMILY MEDICINE

## 2021-05-27 DIAGNOSIS — E61.1 IRON DEFICIENCY: Primary | ICD-10-CM

## 2021-05-27 DIAGNOSIS — R74.8 ELEVATED ALKALINE PHOSPHATASE LEVEL: Primary | ICD-10-CM

## 2021-05-27 DIAGNOSIS — R80.9 MICROALBUMINURIA: ICD-10-CM

## 2021-06-03 ENCOUNTER — PATIENT OUTREACH (OUTPATIENT)
Dept: ADMINISTRATIVE | Facility: OTHER | Age: 46
End: 2021-06-03

## 2021-06-07 ENCOUNTER — OFFICE VISIT (OUTPATIENT)
Dept: SURGERY | Facility: CLINIC | Age: 46
End: 2021-06-07
Payer: COMMERCIAL

## 2021-06-07 DIAGNOSIS — K80.20 SYMPTOMATIC CHOLELITHIASIS: Primary | ICD-10-CM

## 2021-06-07 PROCEDURE — 99203 PR OFFICE/OUTPT VISIT, NEW, LEVL III, 30-44 MIN: ICD-10-PCS | Mod: 95,,, | Performed by: SURGERY

## 2021-06-07 PROCEDURE — 99203 OFFICE O/P NEW LOW 30 MIN: CPT | Mod: 95,,, | Performed by: SURGERY

## 2021-06-09 DIAGNOSIS — R79.89 ABNORMAL COMPLETE BLOOD COUNT: Primary | ICD-10-CM

## 2021-06-17 RX ORDER — POTASSIUM CHLORIDE 750 MG/1
20 CAPSULE, EXTENDED RELEASE ORAL DAILY
Qty: 180 CAPSULE | Refills: 0 | Status: SHIPPED | OUTPATIENT
Start: 2021-06-17 | End: 2022-04-11 | Stop reason: SDUPTHER

## 2021-06-30 ENCOUNTER — PATIENT OUTREACH (OUTPATIENT)
Dept: OTHER | Facility: OTHER | Age: 46
End: 2021-06-30

## 2021-07-01 ENCOUNTER — OFFICE VISIT (OUTPATIENT)
Dept: OPTOMETRY | Facility: CLINIC | Age: 46
End: 2021-07-01
Payer: COMMERCIAL

## 2021-07-01 DIAGNOSIS — H52.13 MYOPIA WITH ASTIGMATISM AND PRESBYOPIA, BILATERAL: Primary | ICD-10-CM

## 2021-07-01 DIAGNOSIS — H52.203 MYOPIA WITH ASTIGMATISM AND PRESBYOPIA, BILATERAL: Primary | ICD-10-CM

## 2021-07-01 DIAGNOSIS — H52.4 MYOPIA WITH ASTIGMATISM AND PRESBYOPIA, BILATERAL: Primary | ICD-10-CM

## 2021-07-01 PROCEDURE — 92004 COMPRE OPH EXAM NEW PT 1/>: CPT | Mod: S$GLB,,, | Performed by: OPTOMETRIST

## 2021-07-01 PROCEDURE — 92015 PR REFRACTION: ICD-10-PCS | Mod: S$GLB,,, | Performed by: OPTOMETRIST

## 2021-07-01 PROCEDURE — 92004 PR EYE EXAM, NEW PATIENT,COMPREHESV: ICD-10-PCS | Mod: S$GLB,,, | Performed by: OPTOMETRIST

## 2021-07-01 PROCEDURE — 99999 PR PBB SHADOW E&M-EST. PATIENT-LVL II: ICD-10-PCS | Mod: PBBFAC,,, | Performed by: OPTOMETRIST

## 2021-07-01 PROCEDURE — 92015 DETERMINE REFRACTIVE STATE: CPT | Mod: S$GLB,,, | Performed by: OPTOMETRIST

## 2021-07-01 PROCEDURE — 99999 PR PBB SHADOW E&M-EST. PATIENT-LVL II: CPT | Mod: PBBFAC,,, | Performed by: OPTOMETRIST

## 2021-07-20 ENCOUNTER — IMMUNIZATION (OUTPATIENT)
Dept: FAMILY MEDICINE | Facility: CLINIC | Age: 46
End: 2021-07-20
Payer: COMMERCIAL

## 2021-07-20 DIAGNOSIS — Z23 NEED FOR VACCINATION: Primary | ICD-10-CM

## 2021-07-20 PROCEDURE — 91300 COVID-19, MRNA, LNP-S, PF, 30 MCG/0.3 ML DOSE VACCINE: CPT | Mod: PBBFAC | Performed by: INTERNAL MEDICINE

## 2021-07-27 ENCOUNTER — TELEPHONE (OUTPATIENT)
Dept: GASTROENTEROLOGY | Facility: CLINIC | Age: 46
End: 2021-07-27

## 2021-08-10 ENCOUNTER — IMMUNIZATION (OUTPATIENT)
Dept: FAMILY MEDICINE | Facility: CLINIC | Age: 46
End: 2021-08-10
Payer: COMMERCIAL

## 2021-08-10 DIAGNOSIS — Z23 NEED FOR VACCINATION: Primary | ICD-10-CM

## 2021-08-10 PROCEDURE — 0002A COVID-19, MRNA, LNP-S, PF, 30 MCG/0.3 ML DOSE VACCINE: CPT | Mod: CV19,,, | Performed by: INTERNAL MEDICINE

## 2021-08-10 PROCEDURE — 91300 COVID-19, MRNA, LNP-S, PF, 30 MCG/0.3 ML DOSE VACCINE: CPT | Mod: ,,, | Performed by: INTERNAL MEDICINE

## 2021-08-10 PROCEDURE — 91300 COVID-19, MRNA, LNP-S, PF, 30 MCG/0.3 ML DOSE VACCINE: ICD-10-PCS | Mod: ,,, | Performed by: INTERNAL MEDICINE

## 2021-08-10 PROCEDURE — 0002A COVID-19, MRNA, LNP-S, PF, 30 MCG/0.3 ML DOSE VACCINE: ICD-10-PCS | Mod: CV19,,, | Performed by: INTERNAL MEDICINE

## 2021-10-14 ENCOUNTER — PATIENT MESSAGE (OUTPATIENT)
Dept: ADMINISTRATIVE | Facility: OTHER | Age: 46
End: 2021-10-14
Payer: COMMERCIAL

## 2021-11-05 ENCOUNTER — PATIENT MESSAGE (OUTPATIENT)
Dept: OTHER | Facility: OTHER | Age: 46
End: 2021-11-05
Payer: COMMERCIAL

## 2021-12-10 ENCOUNTER — PATIENT MESSAGE (OUTPATIENT)
Dept: OTHER | Facility: OTHER | Age: 46
End: 2021-12-10
Payer: COMMERCIAL

## 2021-12-27 ENCOUNTER — LAB VISIT (OUTPATIENT)
Dept: PRIMARY CARE CLINIC | Facility: OTHER | Age: 46
End: 2021-12-27

## 2021-12-27 DIAGNOSIS — R68.83 CHILLS: ICD-10-CM

## 2021-12-27 DIAGNOSIS — R51.9 NONINTRACTABLE HEADACHE, UNSPECIFIED CHRONICITY PATTERN, UNSPECIFIED HEADACHE TYPE: ICD-10-CM

## 2021-12-27 DIAGNOSIS — R06.02 SOB (SHORTNESS OF BREATH): ICD-10-CM

## 2021-12-27 DIAGNOSIS — R05.9 COUGH: Primary | ICD-10-CM

## 2021-12-27 DIAGNOSIS — R61 SWEATING INCREASE: ICD-10-CM

## 2021-12-27 LAB
CTP QC/QA: YES
SARS-COV-2 AG RESP QL IA.RAPID: NEGATIVE

## 2022-01-09 ENCOUNTER — PATIENT MESSAGE (OUTPATIENT)
Dept: ADMINISTRATIVE | Facility: OTHER | Age: 47
End: 2022-01-09
Payer: COMMERCIAL

## 2022-01-09 ENCOUNTER — LAB VISIT (OUTPATIENT)
Dept: PRIMARY CARE CLINIC | Facility: OTHER | Age: 47
End: 2022-01-09

## 2022-01-09 DIAGNOSIS — R05.9 COUGH: ICD-10-CM

## 2022-01-09 DIAGNOSIS — R05.9 COUGH: Primary | ICD-10-CM

## 2022-01-09 LAB
CTP QC/QA: YES
SARS-COV-2 AG RESP QL IA.RAPID: NEGATIVE

## 2022-01-09 PROCEDURE — 87811 SARS-COV-2 COVID19 W/OPTIC: CPT

## 2022-01-12 ENCOUNTER — LAB VISIT (OUTPATIENT)
Dept: PRIMARY CARE CLINIC | Facility: CLINIC | Age: 47
End: 2022-01-12
Payer: COMMERCIAL

## 2022-01-12 ENCOUNTER — PATIENT MESSAGE (OUTPATIENT)
Dept: ADMINISTRATIVE | Facility: OTHER | Age: 47
End: 2022-01-12
Payer: COMMERCIAL

## 2022-01-12 DIAGNOSIS — U07.1 COVID-19 VIRUS DETECTED: ICD-10-CM

## 2022-01-12 DIAGNOSIS — Z20.822 CONTACT WITH AND (SUSPECTED) EXPOSURE TO COVID-19: ICD-10-CM

## 2022-01-12 LAB
CTP QC/QA: YES
SARS-COV-2 AG RESP QL IA.RAPID: POSITIVE

## 2022-01-12 PROCEDURE — 87811 SARS-COV-2 COVID19 W/OPTIC: CPT

## 2022-01-15 ENCOUNTER — NURSE TRIAGE (OUTPATIENT)
Dept: ADMINISTRATIVE | Facility: CLINIC | Age: 47
End: 2022-01-15
Payer: COMMERCIAL

## 2022-01-15 NOTE — TELEPHONE ENCOUNTER
Pt contacted through Covid Symptom tracking for an escalation of symptoms. No contact. 2nd VM advised we would not make any additional attempts today but if you need to speak with a nurse, please reply to text message again or call [phone]. Thank you.     Reason for Disposition   Message left on unidentified voice mail.  Phone number verified.    Additional Information   Negative: Caller has already spoken with the PCP and has no further questions.   Negative: Caller has already spoken with another triager and has no further questions.   Negative: Caller has already spoken with another triager or PCP AND has further questions AND triager able to answer questions.    Protocols used: NO CONTACT OR DUPLICATE CONTACT CALL-A-

## 2022-03-11 ENCOUNTER — TELEPHONE (OUTPATIENT)
Dept: GASTROENTEROLOGY | Facility: CLINIC | Age: 47
End: 2022-03-11
Payer: COMMERCIAL

## 2022-03-11 NOTE — LETTER
March 11, 2022    Debbi Brandon Zhu  [address] [facility] [address]             [facility] - Gastroenterology  [address], [place]   [facility] [address]  Phone: [phone]  Fax: [phone] Dear Ms. Zhu:    We have attempted to contact you to schedule a screening colonoscopy that was ordered by your doctor. Please contact the office to schedule at [phone].      If you have any questions or concerns, please don't hesitate to call.    Sincerely,        Gi Torres MD

## 2022-03-18 ENCOUNTER — OFFICE VISIT (OUTPATIENT)
Dept: FAMILY MEDICINE | Facility: CLINIC | Age: 47
End: 2022-03-18
Payer: COMMERCIAL

## 2022-03-18 VITALS
HEART RATE: 79 BPM | BODY MASS INDEX: 34.89 KG/M2 | DIASTOLIC BLOOD PRESSURE: 80 MMHG | OXYGEN SATURATION: 97 % | WEIGHT: 189.63 LBS | HEIGHT: 62 IN | SYSTOLIC BLOOD PRESSURE: 122 MMHG

## 2022-03-18 DIAGNOSIS — Z12.31 ENCOUNTER FOR SCREENING MAMMOGRAM FOR MALIGNANT NEOPLASM OF BREAST: ICD-10-CM

## 2022-03-18 DIAGNOSIS — R80.9 MICROALBUMINURIA: ICD-10-CM

## 2022-03-18 DIAGNOSIS — R74.8 ELEVATED ALKALINE PHOSPHATASE LEVEL: ICD-10-CM

## 2022-03-18 DIAGNOSIS — E61.1 IRON DEFICIENCY: ICD-10-CM

## 2022-03-18 DIAGNOSIS — I10 HYPERTENSION, ESSENTIAL: ICD-10-CM

## 2022-03-18 DIAGNOSIS — Z00.00 ANNUAL PHYSICAL EXAM: Primary | ICD-10-CM

## 2022-03-18 PROCEDURE — 99396 PR PREVENTIVE VISIT,EST,40-64: ICD-10-PCS | Mod: 25,S$GLB,, | Performed by: FAMILY MEDICINE

## 2022-03-18 PROCEDURE — 1160F PR REVIEW ALL MEDS BY PRESCRIBER/CLIN PHARMACIST DOCUMENTED: ICD-10-PCS | Mod: CPTII,S$GLB,, | Performed by: FAMILY MEDICINE

## 2022-03-18 PROCEDURE — 1160F RVW MEDS BY RX/DR IN RCRD: CPT | Mod: CPTII,S$GLB,, | Performed by: FAMILY MEDICINE

## 2022-03-18 PROCEDURE — 3066F PR DOCUMENTATION OF TREATMENT FOR NEPHROPATHY: ICD-10-PCS | Mod: CPTII,S$GLB,, | Performed by: FAMILY MEDICINE

## 2022-03-18 PROCEDURE — 3008F BODY MASS INDEX DOCD: CPT | Mod: CPTII,S$GLB,, | Performed by: FAMILY MEDICINE

## 2022-03-18 PROCEDURE — 99999 PR PBB SHADOW E&M-EST. PATIENT-LVL IV: CPT | Mod: PBBFAC,,, | Performed by: FAMILY MEDICINE

## 2022-03-18 PROCEDURE — 1159F PR MEDICATION LIST DOCUMENTED IN MEDICAL RECORD: ICD-10-PCS | Mod: CPTII,S$GLB,, | Performed by: FAMILY MEDICINE

## 2022-03-18 PROCEDURE — 3074F SYST BP LT 130 MM HG: CPT | Mod: CPTII,S$GLB,, | Performed by: FAMILY MEDICINE

## 2022-03-18 PROCEDURE — 3061F NEG MICROALBUMINURIA REV: CPT | Mod: CPTII,S$GLB,, | Performed by: FAMILY MEDICINE

## 2022-03-18 PROCEDURE — 3079F PR MOST RECENT DIASTOLIC BLOOD PRESSURE 80-89 MM HG: ICD-10-PCS | Mod: CPTII,S$GLB,, | Performed by: FAMILY MEDICINE

## 2022-03-18 PROCEDURE — 3008F PR BODY MASS INDEX (BMI) DOCUMENTED: ICD-10-PCS | Mod: CPTII,S$GLB,, | Performed by: FAMILY MEDICINE

## 2022-03-18 PROCEDURE — 90471 IMMUNIZATION ADMIN: CPT | Mod: S$GLB,,, | Performed by: FAMILY MEDICINE

## 2022-03-18 PROCEDURE — 3066F NEPHROPATHY DOC TX: CPT | Mod: CPTII,S$GLB,, | Performed by: FAMILY MEDICINE

## 2022-03-18 PROCEDURE — 99396 PREV VISIT EST AGE 40-64: CPT | Mod: 25,S$GLB,, | Performed by: FAMILY MEDICINE

## 2022-03-18 PROCEDURE — 3074F PR MOST RECENT SYSTOLIC BLOOD PRESSURE < 130 MM HG: ICD-10-PCS | Mod: CPTII,S$GLB,, | Performed by: FAMILY MEDICINE

## 2022-03-18 PROCEDURE — 90471 TDAP VACCINE GREATER THAN OR EQUAL TO 7YO IM: ICD-10-PCS | Mod: S$GLB,,, | Performed by: FAMILY MEDICINE

## 2022-03-18 PROCEDURE — 3079F DIAST BP 80-89 MM HG: CPT | Mod: CPTII,S$GLB,, | Performed by: FAMILY MEDICINE

## 2022-03-18 PROCEDURE — 90715 TDAP VACCINE 7 YRS/> IM: CPT | Mod: S$GLB,,, | Performed by: FAMILY MEDICINE

## 2022-03-18 PROCEDURE — 1159F MED LIST DOCD IN RCRD: CPT | Mod: CPTII,S$GLB,, | Performed by: FAMILY MEDICINE

## 2022-03-18 PROCEDURE — 99999 PR PBB SHADOW E&M-EST. PATIENT-LVL IV: ICD-10-PCS | Mod: PBBFAC,,, | Performed by: FAMILY MEDICINE

## 2022-03-18 PROCEDURE — 3061F PR NEG MICROALBUMINURIA RESULT DOCUMENTED/REVIEW: ICD-10-PCS | Mod: CPTII,S$GLB,, | Performed by: FAMILY MEDICINE

## 2022-03-18 PROCEDURE — 90715 TDAP VACCINE GREATER THAN OR EQUAL TO 7YO IM: ICD-10-PCS | Mod: S$GLB,,, | Performed by: FAMILY MEDICINE

## 2022-03-18 RX ORDER — FERROUS SULFATE 325(65) MG
325 TABLET ORAL
Qty: 90 TABLET | Refills: 3 | Status: SHIPPED | OUTPATIENT
Start: 2022-03-18 | End: 2023-01-03 | Stop reason: SDUPTHER

## 2022-03-18 NOTE — PROGRESS NOTES
EST PATIENT VISIT FAMILY MEDICINE    CC:   Chief Complaint   Patient presents with    Annual Exam       HPI: Debbi Zhu  is a 47 y.o. female:    Patient is known to me. She presents for annual. She notes she is doing well overall. Splitting amlodipine to 5mg BID did not help swelling but it is not significantly bothersome to her. She has been out of her iron tabs recently.       ROS: Review of Systems   HENT: Negative for hearing loss.    Eyes: Negative for discharge.   Respiratory: Negative for wheezing.    Cardiovascular: Negative for chest pain and palpitations.   Gastrointestinal: Negative for blood in stool, constipation, diarrhea and vomiting.   Genitourinary: Negative for dysuria and hematuria.   Musculoskeletal: Negative for neck pain.   Neurological: Negative for weakness and headaches.   Endo/Heme/Allergies: Negative for polydipsia.       PMHX:   Past Medical History:   Diagnosis Date    Abnormal Pap smear of cervix     laser ablation    Anemia     Fibroids     Hypertension        PSHX:   Past Surgical History:   Procedure Laterality Date    BLADDER REPAIR N/A 2019    Procedure: REPAIR, BLADDER Laproscopic;  Surgeon: Emmanuelle Davies MD;  Location: [facility];  Service: OB/GYN;  Laterality: N/A;     SECTION      6    DILATION AND CURETTAGE OF UTERUS      HYSTERECTOMY      LAPAROSCOPIC SALPINGO-OOPHORECTOMY Bilateral 2019    Procedure: SALPINGO-OOPHORECTOMY, LAPAROSCOPIC;  Surgeon: Emmanuelle Davies MD;  Location: [facility];  Service: OB/GYN;  Laterality: Bilateral;    LAPAROSCOPIC TOTAL HYSTERECTOMY N/A 2019    Procedure: HYSTERECTOMY, TOTAL, LAPAROSCOPIC;  Surgeon: Emmanuelle Davies MD;  Location: [facility];  Service: OB/GYN;  Laterality: N/A;    LASER ABLATION OF THE CERVIX      Secondary to abnormal pap    OOPHORECTOMY      TUBAL LIGATION         FAMHX:   Family History   Problem Relation Age of Onset    Hypertension Father     Colon  "polyps Father     Breast cancer Neg Hx     Colon cancer Neg Hx     Ovarian cancer Neg Hx        SOCHX:   Social History     Socioeconomic History    Marital status:    Tobacco Use    Smoking status: Never Smoker    Smokeless tobacco: Never Used   Substance and Sexual Activity    Alcohol use: Yes     Comment: maybe 1-2 times per year    Drug use: No    Sexual activity: Yes     Partners: Male     Birth control/protection: See Surgical Hx       ALLERGIES: Review of patient's allergies indicates:  No Known Allergies    MEDS:   Current Outpatient Medications:     amLODIPine (NORVASC) 10 MG tablet, Take 1 tablet (10 mg total) by mouth once daily., Disp: 90 tablet, Rfl: 3    hydroCHLOROthiazide (HYDRODIURIL) 12.5 MG Tab, Take 1 tablet (12.5 mg total) by mouth once daily., Disp: 90 tablet, Rfl: 3    potassium chloride (MICRO-K) 10 MEQ CpSR, Take 2 capsules (20 mEq total) by mouth once daily., Disp: 180 capsule, Rfl: 0    valsartan (DIOVAN) 80 MG tablet, Take 1 tablet (80 mg total) by mouth once daily., Disp: 90 tablet, Rfl: 3    ferrous sulfate (FEOSOL) 325 mg (65 mg iron) Tab tablet, Take 1 tablet (325 mg total) by mouth 3 (three) times daily with meals., Disp: 90 tablet, Rfl: 3    OBJECTIVE:   Vitals:    03/18/22 1327   BP: 122/80   BP Location: Left arm   Patient Position: Sitting   BP Method: Medium (Manual)   Pulse: 79   SpO2: 97%   Weight: 86 kg (189 lb 9.5 oz)   Height: 5' 2" (1.575 m)     Body mass index is 34.68 kg/m².      Physical Exam  Vitals and nursing note reviewed.   Constitutional:       Appearance: Normal appearance.   HENT:      Head: Normocephalic.   Eyes:      General:         Right eye: No discharge.         Left eye: No discharge.      Extraocular Movements: Extraocular movements intact.   Cardiovascular:      Rate and Rhythm: Normal rate and regular rhythm.      Heart sounds: Normal heart sounds.   Pulmonary:      Effort: Pulmonary effort is normal.      Breath sounds: Normal " breath sounds.   Skin:     Comments: No obvious rash on exposed skin   Neurological:      Mental Status: She is alert.   Psychiatric:         Behavior: Behavior normal.           LABS:   A1C:      CBC:  Recent Labs   Lab 03/18/22  1700   WBC 5.51   RBC 4.60   Hemoglobin 12.3   Hematocrit 38.8   Platelets 288   MCV 84   MCH 26.7 L   MCHC 31.7 L     CMP:  Recent Labs   Lab 03/18/22  1700   Glucose 113 H   Calcium 8.8   Albumin 4.4   Total Protein 7.9   Sodium 144   Potassium 3.4 L   CO2 29   Chloride 104   BUN 12   Creatinine 0.81   Alkaline Phosphatase 159 H   ALT 16   AST 28   Total Bilirubin 0.4     LIPIDS:  Recent Labs   Lab 05/26/21  1500 03/18/22  1700   TSH 1.240  --    HDL  --  44   Cholesterol  --  177   Triglycerides  --  84   LDL Cholesterol  --  116.2   HDL/Cholesterol Ratio  --  24.9   Non-HDL Cholesterol  --  133   Total Cholesterol/HDL Ratio  --  4.0     TSH:  Recent Labs   Lab 05/26/21  1500   TSH 1.240         ASSESSMENT & PLAN:    Problem List Items Addressed This Visit        Cardiac/Vascular    Hypertension, essential (Chronic)    Overview     Well controlled. Continue current regimen              Renal/    Microalbuminuria (Chronic)    Relevant Orders    Microalbumin/Creatinine Ratio, Urine (Completed)       Other    Elevated alkaline phosphatase level (Chronic)    Relevant Orders    CBC Auto Differential (Completed)    Comprehensive Metabolic Panel (Completed)    Gamma GT (Completed)      Other Visit Diagnoses     Annual physical exam    -  Primary    Relevant Orders    Lipid Panel (Completed)    Iron deficiency        Relevant Medications    ferrous sulfate (FEOSOL) 325 mg (65 mg iron) Tab tablet    Other Relevant Orders    Iron and TIBC (Completed)    Ferritin (Completed)    Encounter for screening mammogram for malignant neoplasm of breast        Relevant Orders    Mammo Digital Screening Bilat        Preventative cares discussed and updated as needed. Lifestyle modifications discussed as  needed.    Follow up in about 6 months (around 9/18/2022) for blood pressure; Aileen.      RTC/ED precautions discussed where applicable.   Encouraged patient to let me know if there are any further questions/concerns.     Advise patient/caretaker to check with insurance regarding orders to avoid unexpected fees/costs.     The patient/caretaker indicates understanding of these issues and agrees with the plan.    Dr. Kb Mcgowan MD  Family Medicine

## 2022-03-20 PROBLEM — R80.9 MICROALBUMINURIA: Chronic | Status: ACTIVE | Noted: 2021-05-27

## 2022-03-20 PROBLEM — R74.8 ELEVATED ALKALINE PHOSPHATASE LEVEL: Chronic | Status: ACTIVE | Noted: 2021-05-27

## 2022-03-25 ENCOUNTER — PATIENT MESSAGE (OUTPATIENT)
Dept: FAMILY MEDICINE | Facility: CLINIC | Age: 47
End: 2022-03-25
Payer: COMMERCIAL

## 2022-04-06 ENCOUNTER — PATIENT OUTREACH (OUTPATIENT)
Dept: ADMINISTRATIVE | Facility: OTHER | Age: 47
End: 2022-04-06
Payer: COMMERCIAL

## 2022-04-06 NOTE — PROGRESS NOTES
Health Maintenance Due   Topic Date Due    Hepatitis C Screening  Never done    COVID-19 Vaccine (3 - Booster for Pfizer series) 01/10/2022     Updates were requested from care everywhere.  Chart was reviewed for overdue Proactive [facility] Encounters (MAYKEL) topics (CRS, Breast Cancer Screening, Eye exam)  Health Maintenance has been updated.  LINKS immunization registry triggered.  Immunizations were reconciled.

## 2022-04-11 ENCOUNTER — PATIENT MESSAGE (OUTPATIENT)
Dept: ADMINISTRATIVE | Facility: OTHER | Age: 47
End: 2022-04-11
Payer: COMMERCIAL

## 2022-04-11 DIAGNOSIS — I10 HYPERTENSION, ESSENTIAL: ICD-10-CM

## 2022-04-11 DIAGNOSIS — R79.89 ABNORMAL COMPLETE BLOOD COUNT: ICD-10-CM

## 2022-04-11 NOTE — TELEPHONE ENCOUNTER
No new care gaps identified.  Powered by [employer]. Reference number: [id number].   4/11/2022 3:06:19 PM CDT

## 2022-04-11 NOTE — TELEPHONE ENCOUNTER
No new care gaps identified.  Powered by [employer]. Reference number: [id number].   4/11/2022 3:06:17 PM CDT

## 2022-04-12 RX ORDER — HYDROCHLOROTHIAZIDE 12.5 MG/1
12.5 TABLET ORAL DAILY
Qty: 90 TABLET | Refills: 0 | Status: SHIPPED | OUTPATIENT
Start: 2022-04-12 | End: 2022-08-03 | Stop reason: SDUPTHER

## 2022-04-12 RX ORDER — POTASSIUM CHLORIDE 750 MG/1
20 CAPSULE, EXTENDED RELEASE ORAL DAILY
Qty: 180 CAPSULE | Refills: 0 | Status: SHIPPED | OUTPATIENT
Start: 2022-04-12 | End: 2022-12-04 | Stop reason: SDUPTHER

## 2022-04-12 RX ORDER — AMLODIPINE BESYLATE 10 MG/1
10 TABLET ORAL DAILY
Qty: 90 TABLET | Refills: 3 | Status: SHIPPED | OUTPATIENT
Start: 2022-04-12 | End: 2022-04-13 | Stop reason: SDUPTHER

## 2022-04-12 RX ORDER — AMLODIPINE BESYLATE 10 MG/1
10 TABLET ORAL DAILY
Qty: 90 TABLET | Refills: 3 | Status: SHIPPED | OUTPATIENT
Start: 2022-04-12 | End: 2022-04-12 | Stop reason: CLARIF

## 2022-04-12 RX ORDER — VALSARTAN 80 MG/1
80 TABLET ORAL DAILY
Qty: 90 TABLET | Refills: 0 | Status: SHIPPED | OUTPATIENT
Start: 2022-04-12 | End: 2022-08-03 | Stop reason: SDUPTHER

## 2022-04-12 NOTE — TELEPHONE ENCOUNTER
Refill Routing Note   Medication(s) are not appropriate for processing by [facility] for the following reason(s):      - Medication not previously prescribed by PCP    [facility] action(s):  Defer          Medication reconciliation completed: No     Appointments  past 12m or future 3m with PCP    Date Provider   Last Visit   3/18/2022 Kb Mcgowan MD   Next Visit   Visit date not found Kb Mcgowan MD   ED visits in past 90 days: 0        Note composed:2:59 PM 04/12/2022

## 2022-04-26 ENCOUNTER — TELEPHONE (OUTPATIENT)
Dept: GASTROENTEROLOGY | Facility: CLINIC | Age: 47
End: 2022-04-26
Payer: COMMERCIAL

## 2022-04-26 NOTE — TELEPHONE ENCOUNTER
----- Message from Gi Torres MD sent at 4/26/2022 10:09 AM CDT -----  Large benign SSA, repeat 3 years, see recs on op note as well please.  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  RTC for constipation concerns.

## 2022-05-10 ENCOUNTER — TELEPHONE (OUTPATIENT)
Dept: GASTROENTEROLOGY | Facility: CLINIC | Age: 47
End: 2022-05-10
Payer: COMMERCIAL

## 2022-06-14 ENCOUNTER — IMMUNIZATION (OUTPATIENT)
Dept: FAMILY MEDICINE | Facility: CLINIC | Age: 47
End: 2022-06-14
Payer: COMMERCIAL

## 2022-06-14 DIAGNOSIS — Z23 NEED FOR VACCINATION: Primary | ICD-10-CM

## 2022-06-14 PROCEDURE — 91305 COVID-19, MRNA, LNP-S, PF, 30 MCG/0.3 ML DOSE VACCINE (PFIZER): CPT | Mod: PBBFAC | Performed by: FAMILY MEDICINE

## 2022-08-03 DIAGNOSIS — I10 HYPERTENSION, ESSENTIAL: ICD-10-CM

## 2022-08-03 NOTE — TELEPHONE ENCOUNTER
Refill Routing Note   Medication(s) are not appropriate for processing by [facility] for the following reason(s):      - Required laboratory values are abnormal  - Medication not previously prescribed by PCP    [facility] action(s):  Defer          Medication reconciliation completed: No     Appointments  past 12m or future 3m with PCP    Date Provider   Last Visit   3/18/2022 Kb Mcgowan MD   Next Visit   Visit date not found Kb Mcgowan MD   ED visits in past 90 days: 0        Note composed:4:40 PM 08/03/2022

## 2022-08-03 NOTE — TELEPHONE ENCOUNTER
No new care gaps identified.  [facility] Embedded Care Gaps. Reference number: [id number]. 8/03/2022   8:42:05 AM ENRRIQUET

## 2022-08-04 RX ORDER — HYDROCHLOROTHIAZIDE 12.5 MG/1
12.5 TABLET ORAL DAILY
Qty: 90 TABLET | Refills: 2 | Status: SHIPPED | OUTPATIENT
Start: 2022-08-04 | End: 2023-04-13 | Stop reason: SDUPTHER

## 2022-08-04 RX ORDER — VALSARTAN 80 MG/1
80 TABLET ORAL DAILY
Qty: 90 TABLET | Refills: 2 | Status: SHIPPED | OUTPATIENT
Start: 2022-08-04 | End: 2023-04-13 | Stop reason: SDUPTHER

## 2022-09-16 ENCOUNTER — OFFICE VISIT (OUTPATIENT)
Dept: OBSTETRICS AND GYNECOLOGY | Facility: CLINIC | Age: 47
End: 2022-09-16
Payer: MEDICAID

## 2022-09-16 VITALS
BODY MASS INDEX: 31.35 KG/M2 | WEIGHT: 171.38 LBS | DIASTOLIC BLOOD PRESSURE: 90 MMHG | SYSTOLIC BLOOD PRESSURE: 131 MMHG

## 2022-09-16 DIAGNOSIS — Z12.31 ENCOUNTER FOR SCREENING MAMMOGRAM FOR BREAST CANCER: ICD-10-CM

## 2022-09-16 DIAGNOSIS — Z01.419 WELL WOMAN EXAM WITH ROUTINE GYNECOLOGICAL EXAM: Primary | ICD-10-CM

## 2022-09-16 DIAGNOSIS — N94.10 DYSPAREUNIA IN FEMALE: ICD-10-CM

## 2022-09-16 PROCEDURE — 99999 PR PBB SHADOW E&M-EST. PATIENT-LVL III: ICD-10-PCS | Mod: PBBFAC,,, | Performed by: OBSTETRICS & GYNECOLOGY

## 2022-09-16 PROCEDURE — 99396 PREV VISIT EST AGE 40-64: CPT | Mod: S$PBB,,, | Performed by: OBSTETRICS & GYNECOLOGY

## 2022-09-16 PROCEDURE — 99396 PR PREVENTIVE VISIT,EST,40-64: ICD-10-PCS | Mod: S$PBB,,, | Performed by: OBSTETRICS & GYNECOLOGY

## 2022-09-16 PROCEDURE — 99999 PR PBB SHADOW E&M-EST. PATIENT-LVL III: CPT | Mod: PBBFAC,,, | Performed by: OBSTETRICS & GYNECOLOGY

## 2022-09-16 RX ORDER — PHENTERMINE HYDROCHLORIDE 37.5 MG/1
37.5 TABLET ORAL DAILY
COMMUNITY
Start: 2022-09-08

## 2022-09-16 RX ORDER — ESTRADIOL 0.1 MG/G
CREAM VAGINAL
Qty: 42.5 G | Refills: 1 | Status: SHIPPED | OUTPATIENT
Start: 2022-09-16

## 2022-09-16 NOTE — PROGRESS NOTES
GYNECOLOGY OFFICE NOTE    Reason for visit: annual    HPI: Pt is a 47 y.o.  female  who presents for annual. Menarche: 13. Pt with hx of TLH/BSO secondary to AUB-L. She is sexually active but reports pain with intercourse since surgery with cystotomy repair. Not just related to vaginal dryness because when she uses lubricants she still has pain. She does not desire STI screening. She denies vaginal discharge.  Last pap: 2019, reports hx of abnormal in -all normal since. Last MMG - 2022 negative.     Past Medical History:   Diagnosis Date    Abnormal Pap smear of cervix     laser ablation    Anemia     Fibroids     Hypertension        Past Surgical History:   Procedure Laterality Date    BLADDER REPAIR N/A 2019    Procedure: REPAIR, BLADDER Laproscopic;  Surgeon: Emmanuelle Davies MD;  Location: [facility];  Service: OB/GYN;  Laterality: N/A;     SECTION      6    COLONOSCOPY N/A 2022    Procedure: COLONOSCOPY;  Surgeon: Gi Torres MD;  Location: [facility];  Service: Endoscopy;  Laterality: N/A;    DILATION AND CURETTAGE OF UTERUS      HYSTERECTOMY      LAPAROSCOPIC SALPINGO-OOPHORECTOMY Bilateral 2019    Procedure: SALPINGO-OOPHORECTOMY, LAPAROSCOPIC;  Surgeon: Emmanuelle Davies MD;  Location: [facility];  Service: OB/GYN;  Laterality: Bilateral;    LAPAROSCOPIC TOTAL HYSTERECTOMY N/A 2019    Procedure: HYSTERECTOMY, TOTAL, LAPAROSCOPIC;  Surgeon: Emmanuelle Davies MD;  Location: [facility];  Service: OB/GYN;  Laterality: N/A;    LASER ABLATION OF THE CERVIX      Secondary to abnormal pap    OOPHORECTOMY      TUBAL LIGATION         Family History   Problem Relation Age of Onset    Hypertension Father     Colon polyps Father     Breast cancer Neg Hx     Colon cancer Neg Hx     Ovarian cancer Neg Hx        Social History     Tobacco Use    Smoking status: Never    Smokeless tobacco: Never   Substance Use Topics    Alcohol use: Yes     Comment:  maybe 1-2 times per year    Drug use: No       OB History    Para Term  AB Living   8 6 5 1 2 5   SAB IAB Ectopic Multiple Live Births   2     0 6      # Outcome Date GA Lbr Oumar/2nd Weight Sex Delivery Anes PTL Lv   8 Term 2013 37w5d  2.863 kg (6 lb 5 oz) F CS-LTranv EPI  YOSHI      Birth Comments: pre-eclamptic, cerclage placed at 24wks by dr moreau and placed on bed rest, gestational diabities   7 SAB 12 12w0d             Birth Comments: no d&c    6 Term  37w0d  3.402 kg (7 lb 8 oz) M CS-LTranv EPI  YOSHI      Birth Comments: went into labor after amnio test    5 Term  38w0d  3.685 kg (8 lb 2 oz) F CS-LTranv EPI  YOSHI      Birth Comments: no complications    4 Term  38w0d  3.515 kg (7 lb 12 oz) M CS-LTranv EPI  DEC      Birth Comments: baby passed at 3months due to aspiration of lungs   3 Term  38w0d 13:00 3.09 kg (6 lb 13 oz) M CS-LTranv EPI  YOSHI      Birth Comments: 13hr labor selective  was chosen by patient after not progressing   2   35w0d   M CS-LTranv  Y YOSHI   1 SAB                Current Outpatient Medications   Medication Sig    amLODIPine (NORVASC) 10 MG tablet Take 1 tablet (10 mg total) by mouth once daily.    ferrous sulfate (FEOSOL) 325 mg (65 mg iron) Tab tablet Take 1 tablet (325 mg total) by mouth 3 (three) times daily with meals.    hydroCHLOROthiazide (HYDRODIURIL) 12.5 MG Tab Take 1 tablet (12.5 mg total) by mouth once daily.    potassium chloride (MICRO-K) 10 MEQ CpSR Take 2 capsules (20 mEq total) by mouth once daily.    valsartan (DIOVAN) 80 MG tablet Take 1 tablet (80 mg total) by mouth once daily.    ADIPEX-P 37.5 mg tablet Take 37.5 mg by mouth once daily.    estradioL (ESTRACE) 0.01 % (0.1 mg/gram) vaginal cream Place 1 gram vaginally nightly x 2 weeks then twice a week thereafter     No current facility-administered medications for this visit.       Allergies: Patient has no known allergies.     BP (!) 131/90   Wt 77.7 kg (171 lb 6.4  oz)   LMP  (LMP Unknown)   BMI 31.35 kg/m²     ROS:  GENERAL: Denies fever or chills.   SKIN: Denies rash or lesions.   HEAD: Denies head injury or headache.   CHEST: Denies chest pain or shortness of breath.   CARDIOVASCULAR: Denies palpitations or chest pain.   ABDOMEN: No constipation, diarrhea, nausea, vomiting or rectal bleeding.   URINARY: No dysuria, hematuria, or burning on urination.  REPRODUCTIVE: See HPI.   BREASTS: see HPI  NEUROLOGIC: Denies syncope or weakness.     Physical Exam:  GENERAL: alert, appears stated age and cooperative  NEUROLOGIC: orientated to person, place and time, normal mood and affect   CHEST: Normal respiratory effort  NECK: normal appearance  SKIN: no acne, hirsutism  BREAST EXAM: breasts appear normal, no suspicious masses, no skin or nipple changes or axillary nodes  ABDOMEN: abdomen is soft without significant tenderness, masses  EXTERNAL GENITALIA:  normal general appearance  URETHRA: normal urethra, normal urethral meatus  VAGINA:  normal mucosa, no  lesions, ttp along anterior surface only, cuff intact without evidence of true atrophy/lesions  CERVIX:  absent cervix  UTERUS:  surgically absent  ADNEXA: nontender    Diagnosis:  1. Well woman exam with routine gynecological exam    2. Encounter for screening mammogram for breast cancer    3. Dyspareunia in female        Plan:   1. Annual  2. MMG ordered  3. Can trial vaginal estrogen. Rx sent. Also discussed pelvic floor pt. Possible post surgical changes contributing to discomfort. No urinary complaints and pain not resolved with change in positions. Consider referral to pelvic floor pt s/p trial of vaginal estrogen    Orders Placed This Encounter    Mammo Digital Screening Bilat w/ Vineet    estradioL (ESTRACE) 0.01 % (0.1 mg/gram) vaginal cream       Tashia Tony MD  OB/GYN

## 2023-01-03 DIAGNOSIS — E61.1 IRON DEFICIENCY: ICD-10-CM

## 2023-01-03 RX ORDER — FERROUS SULFATE 325(65) MG
325 TABLET ORAL
Qty: 90 TABLET | Refills: 3 | Status: SHIPPED | OUTPATIENT
Start: 2023-01-03

## 2023-03-02 DIAGNOSIS — R79.89 ABNORMAL COMPLETE BLOOD COUNT: ICD-10-CM

## 2023-03-02 RX ORDER — POTASSIUM CHLORIDE 750 MG/1
20 CAPSULE, EXTENDED RELEASE ORAL DAILY
Qty: 180 CAPSULE | Refills: 0 | Status: SHIPPED | OUTPATIENT
Start: 2023-03-02

## 2023-03-02 NOTE — TELEPHONE ENCOUNTER
Care Due:                  Date            Visit Type   Department     Provider  --------------------------------------------------------------------------------                                MYCHART                              ANNUAL                              CHECKUP/PHY  [facility]  Last Visit: 03-      [facility]  Juan M                              [facility]                              ANNUAL                              CHECKUP/PHY  [facility]  Next Visit: 03-      [facility]                                                            Last  Test          Frequency    Reason                     Performed    Due Date  --------------------------------------------------------------------------------    CMP.........  12 months..  potassium................  03- 03-    [employer] Embedded Care Gaps. Reference number: [id number]. 3/02/2023   4:54:36 PM CST

## 2023-03-06 ENCOUNTER — OFFICE VISIT (OUTPATIENT)
Dept: URGENT CARE | Facility: CLINIC | Age: 48
End: 2023-03-06
Payer: MEDICAID

## 2023-03-06 VITALS
BODY MASS INDEX: 31.47 KG/M2 | DIASTOLIC BLOOD PRESSURE: 91 MMHG | OXYGEN SATURATION: 98 % | HEIGHT: 62 IN | WEIGHT: 171 LBS | RESPIRATION RATE: 17 BRPM | TEMPERATURE: 99 F | HEART RATE: 108 BPM | SYSTOLIC BLOOD PRESSURE: 127 MMHG

## 2023-03-06 DIAGNOSIS — R42 DIZZINESS: ICD-10-CM

## 2023-03-06 DIAGNOSIS — R42 ORTHOSTATIC DIZZINESS: Primary | ICD-10-CM

## 2023-03-06 LAB
GLUCOSE SERPL-MCNC: 88 MG/DL (ref 70–110)
HGB, POC: 14.1 G/DL (ref 12–16)

## 2023-03-06 PROCEDURE — 3080F PR MOST RECENT DIASTOLIC BLOOD PRESSURE >= 90 MM HG: ICD-10-PCS | Mod: CPTII,S$GLB,,

## 2023-03-06 PROCEDURE — 82962 POCT GLUCOSE, HAND-HELD DEVICE: ICD-10-PCS | Mod: S$GLB,,,

## 2023-03-06 PROCEDURE — 3008F PR BODY MASS INDEX (BMI) DOCUMENTED: ICD-10-PCS | Mod: CPTII,S$GLB,,

## 2023-03-06 PROCEDURE — 99203 PR OFFICE/OUTPT VISIT, NEW, LEVL III, 30-44 MIN: ICD-10-PCS | Mod: S$GLB,,,

## 2023-03-06 PROCEDURE — 82962 GLUCOSE BLOOD TEST: CPT | Mod: S$GLB,,,

## 2023-03-06 PROCEDURE — 3008F BODY MASS INDEX DOCD: CPT | Mod: CPTII,S$GLB,,

## 2023-03-06 PROCEDURE — 3074F PR MOST RECENT SYSTOLIC BLOOD PRESSURE < 130 MM HG: ICD-10-PCS | Mod: CPTII,S$GLB,,

## 2023-03-06 PROCEDURE — 1159F MED LIST DOCD IN RCRD: CPT | Mod: CPTII,S$GLB,,

## 2023-03-06 PROCEDURE — 85018 HEMOGLOBIN: CPT | Mod: QW,S$GLB,,

## 2023-03-06 PROCEDURE — 1160F PR REVIEW ALL MEDS BY PRESCRIBER/CLIN PHARMACIST DOCUMENTED: ICD-10-PCS | Mod: CPTII,S$GLB,,

## 2023-03-06 PROCEDURE — 3074F SYST BP LT 130 MM HG: CPT | Mod: CPTII,S$GLB,,

## 2023-03-06 PROCEDURE — 1159F PR MEDICATION LIST DOCUMENTED IN MEDICAL RECORD: ICD-10-PCS | Mod: CPTII,S$GLB,,

## 2023-03-06 PROCEDURE — 3080F DIAST BP >= 90 MM HG: CPT | Mod: CPTII,S$GLB,,

## 2023-03-06 PROCEDURE — 1160F RVW MEDS BY RX/DR IN RCRD: CPT | Mod: CPTII,S$GLB,,

## 2023-03-06 PROCEDURE — 85018 POCT HEMOGLOBIN: ICD-10-PCS | Mod: QW,S$GLB,,

## 2023-03-06 PROCEDURE — 99203 OFFICE O/P NEW LOW 30 MIN: CPT | Mod: S$GLB,,,

## 2023-03-06 RX ORDER — MECLIZINE HCL 12.5 MG 12.5 MG/1
12.5 TABLET ORAL 2 TIMES DAILY PRN
Qty: 10 TABLET | Refills: 0 | Status: SHIPPED | OUTPATIENT
Start: 2023-03-06 | End: 2023-03-11

## 2023-03-06 NOTE — PROGRESS NOTES
"Subjective:       Patient ID: Debbi Zhu is a 48 y.o. female.    Vitals:  height is 5' 2" (1.575 m) and weight is 77.6 kg (171 lb). Her temperature is 98.7 °F (37.1 °C). Her blood pressure is 127/91 (abnormal) and her pulse is 108. Her respiration is 17 and oxygen saturation is 98%.     Chief Complaint: Dizziness    48 year old patient presents dizziness. Patient stated symptoms started Wednesday. Patient stated if she looks up/moves to fast she starts to feels dizzy     Dizziness:   Chronicity:  New  Onset:  In the past 7 days (Wednesday)  Progression since onset:  Unchanged  Pain Scale:  0/10  Duration:  Very brief  Dizziness characteristics:  Spinning inside head only and off-balance   Associated symptoms: weakness.no headaches and no nausea.  Treatments tried:  Nothing    Gastrointestinal:  Negative for nausea.   Neurological:  Positive for dizziness. Negative for headaches.     Objective:      Physical Exam   Constitutional: She is oriented to person, place, and time. She appears well-developed. She is cooperative.  Non-toxic appearance. She does not appear ill. No distress.   HENT:   Head: Normocephalic and atraumatic.   Ears:   Right Ear: Hearing normal.   Left Ear: Hearing normal.   Nose: Nose normal. No mucosal edema, rhinorrhea, nasal deformity or congestion. No epistaxis. Right sinus exhibits no maxillary sinus tenderness and no frontal sinus tenderness. Left sinus exhibits no maxillary sinus tenderness and no frontal sinus tenderness.   Mouth/Throat: Uvula is midline, oropharynx is clear and moist and mucous membranes are normal. Mucous membranes are moist. No trismus in the jaw. Normal dentition. No uvula swelling. Cobblestoning present. No oropharyngeal exudate, posterior oropharyngeal edema, posterior oropharyngeal erythema or tonsillar abscesses. Tonsils are 1+ on the right. Tonsils are 1+ on the left. No tonsillar exudate.   Eyes: Conjunctivae and lids are normal. No visual field deficit " is present. No scleral icterus.   Neck: Trachea normal and phonation normal. Neck supple. No edema present. No erythema present. No neck rigidity present.   Cardiovascular: Normal rate, regular rhythm, S1 normal, S2 normal, normal heart sounds and normal pulses.   Pulmonary/Chest: Effort normal and breath sounds normal. No accessory muscle usage or stridor. No apnea, no tachypnea and no bradypnea. No respiratory distress. She has no decreased breath sounds. She has no wheezes. She has no rhonchi. She has no rales.   Abdominal: Normal appearance.   Musculoskeletal: Normal range of motion.         General: No deformity. Normal range of motion.   Neurological: no focal deficit. She is alert and oriented to person, place, and time. She has normal motor skills and normal sensation. She displays no weakness, no atrophy, no tremor, facial symmetry and no dysarthria. No cranial nerve deficit or sensory deficit. She exhibits normal muscle tone. She has a normal Finger-Nose-Finger Test. Coordination: Romberg sign negative, Heel to shin test normal and Rapid alternating movements normal. She shows no pronator drift. She displays no seizure activity. Gait and coordination normal. Coordination and gait normal. GCS eye subscore is 4. GCS verbal subscore is 5. GCS motor subscore is 6.      Comments: Alert, oriented x 4. EOMI, PERRLA. Cranial nerves intact: facial expressions (smile, raising eyebrows, shutting eyes, pursed lips) symmetric. Shoulder shrug strength 5/5; sternocleidomastoid muscle strength 5/5 bilaterally. Jaw is midline without deviation. Tongue protrudes at midline without fasciculations. Sensation to face in distribution of CN V1, V2, and V3 intact. Sensation to upper and lower extremities intact. Finger to nose, rapid rhythmic alternating movements, and heel to shin test are intact and smooth bilaterally. Patient ambulates unassisted without rigidity or ataxia. Romberg negative. Voice quality, comprehension,  articulation, coherence assessed as appropriate.  Two point discrimination appropriate in facial nerve and in upper and lower extremities    Skin: Skin is warm, dry, intact, not diaphoretic and not pale.   Psychiatric: Her speech is normal and behavior is normal. Judgment and thought content normal.   Nursing note and vitals reviewed.      Results for orders placed or performed in visit on 03/06/23   POCT HEMOGLOBIN   Result Value Ref Range    Hemoglobin 14.1 12 - 16 g/dL   POCT Glucose, Hand-Held Device   Result Value Ref Range    POC Glucose 88 70 - 110 MG/DL       Assessment:       1. Orthostatic dizziness    2. Dizziness          Plan:         Orthostatic dizziness    Dizziness  -     IN OFFICE EKG 12-LEAD (to Muse)  -     POCT HEMOGLOBIN  -     POCT Glucose, Hand-Held Device  -     Orthostatic vital signs  -     Ambulatory referral/consult to ENT  -     meclizine (ANTIVERT) 12.5 mg tablet; Take 1 tablet (12.5 mg total) by mouth 2 (two) times daily as needed for Dizziness.  Dispense: 10 tablet; Refill: 0           Medical Decision Making:   Initial Assessment:   PT in room AAOX4, skin W/D, resp E/U, non toxic in appearance, NAD.  Negative [place]-Hallpike maneuver  Urgent Care Management:  Negative [place]-Hallpike maneuver, patient neurovascularly intact, discussed with patient patient has slightly orthostatic as patient had increase in pulse from lying to standing.  Discussed with patient to please try to orally rehydrate at home if patient can not the patient should go to emergency room for IV fluids.  Discussed with patient I am not 100% convinced that the reason patient is dizzy is due to a fluid volume decrease but due to vertigo and I will send patient a referral in to ENT as well as send patient home with meclizine.  Discussed with patient to please follow-up with ENT and to take meclizine as prescribed.  Discussed EKG with patient as well as patient's hemoglobin and sugar which were all in normal range.   Patient agrees with treatment plan and verbalizes understanding patient ambulatory from clinic in no acute distress.  EKG is NORMAL SINUS RHYTHM nonspecfic T wave abnormality Abnormal ECG, when compared to EKG 10/16/2019 EKG today is simlar, noted EKG today had artifact.

## 2023-03-06 NOTE — PATIENT INSTRUCTIONS
If your condition worsens or fails to improve we recommend that you receive another evaluation at the ER immediately or contact your PCP to discuss your concerns or return here. You must understand that you've received an urgent care treatment only and that you may be released before all your medical problems are known or treated. You the patient will arrange for follouwp care as instructed.   If you were given narcotic prescriptions or muscle relaxants do not operate heavy equipment or machinery while taking these medications   Get rest and drink fluids. Get plenty of rest.  Watch for increase pain, fever, vomiting, neck pain or mental confusion.   You can also use tylenol or ibuprofen as directed as long as you don't have any allergies to these medications or medical conditions such as ulcers, liver or kidney disease or blood thinners etc that would prevent you from taking these meds.

## 2023-03-22 DIAGNOSIS — I10 HYPERTENSION, ESSENTIAL: ICD-10-CM

## 2023-03-27 ENCOUNTER — PATIENT MESSAGE (OUTPATIENT)
Dept: ADMINISTRATIVE | Facility: HOSPITAL | Age: 48
End: 2023-03-27
Payer: MEDICAID

## 2023-05-19 NOTE — PROGRESS NOTES
"Subjective:      Debbi Zhu is a 44 y.o. female who returns today regarding her bladder injury.    The patient is s/p laparoscopic hysterectomy and intraoperative cystotomy by Dr. Davies with intraoperative repair by Dr. Hartley on 12/17. Successful VT on 12/30 after cystogram showed no leak.     She is doing great. Denies dysuria, frequency, urgency and gross hematuria. Reports occasional bladder discomfort that is relieved with ditropan. Denies fever/chills. Reports occasional stress incontinence, improving with kegels.      The following portions of the patient's history were reviewed and updated as appropriate: allergies, current medications, past family history, past medical history, past social history, past surgical history and problem list.    Review of Systems  Constitutional: no fever or chills  ENT: no nasal congestion or sore throat  Respiratory: no cough or shortness of breath  Cardiovascular: no chest pain or palpitations  Gastrointestinal: no nausea or vomiting, tolerating diet  Genitourinary: as per HPI  Hematologic/Lymphatic: no easy bruising or lymphadenopathy  Musculoskeletal: no arthralgias or myalgias  Neurological: no seizures or tremors  Behavioral/Psych: no auditory or visual hallucinations     Objective:   Vital Signs:BP (!) 153/97 (BP Location: Left arm, Patient Position: Sitting, BP Method: X-Large (Automatic))   Pulse 81   Ht 5' 2" (1.575 m)   Wt 83.9 kg (185 lb)   BMI 33.84 kg/m²     Physical Exam   General: alert and oriented, no acute distress  Head: normocephalic, atraumatic  Neck: supple, no lymphadenopathy, normal ROM, no masses  Respiratory: Symmetric expansion, non-labored breathing  Cardiovascular: regular rate and rhythm, nomal pulses, no peripheral edema  Abdomen: soft, non tender, non distended, no palpable masses, no hernias, no hepatomegaly or splenomegaly  Pelvic: deferred  Lymphatic: no inguinal nodes  Skin: normal coloration and turgor, no rashes, no " suspicious skin lesions noted  Neuro: alert and oriented x3, no gross deficits  Psych: normal judgment and insight, normal mood/affect and non-anxious  No CVA tenderness     Lab Review   Urinalysis demonstrates positive for nitrites, leukocytes, red blood cells - ON AZO  PVR: 13 mL  Lab Results   Component Value Date    WBC 6.42 12/04/2019    HGB 10.6 (L) 12/04/2019    HCT 34.6 (L) 12/04/2019    MCV 79 (L) 12/04/2019     12/04/2019     Lab Results   Component Value Date    CREATININE 0.8 12/04/2019    BUN 10 12/04/2019       Imaging   None    Assessment:     1. Urinary frequency    2.      S/p bladder repair    Plan:   Debbi was seen today for follow-up.    Diagnoses and all orders for this visit:    Urinary frequency  -     POCT URINE DIPSTICK WITHOUT MICROSCOPE  -     POCT Bladder Scan  -     Urine culture    S/P bladder repair    Plan:  --Urine culture today  --Okay to use AZO and ditropan PRN  --Again discussed the SS of vesicovaginal fistula   --Follow up PRN    Eye Shield Used: No

## 2023-07-26 ENCOUNTER — TELEPHONE (OUTPATIENT)
Dept: OPTOMETRY | Facility: CLINIC | Age: 48
End: 2023-07-26
Payer: MEDICAID

## 2023-07-26 NOTE — TELEPHONE ENCOUNTER
Unable to leave voicemail(voicemail was full)- made appointment with Dr. Delgado and mailed out.    ----- Message from Melissa Piedra sent at 7/26/2023  7:14 AM CDT -----  Regarding: appointment  Name of Who is Calling: Debbi           What is the request in detail: Patient is requesting a call back to schedule a routine checkup.           Can the clinic reply by [facility]: No           [facility] [phone]

## 2023-10-18 ENCOUNTER — PATIENT MESSAGE (OUTPATIENT)
Dept: ADMINISTRATIVE | Facility: HOSPITAL | Age: 48
End: 2023-10-18
Payer: MEDICAID

## 2023-10-18 ENCOUNTER — PATIENT OUTREACH (OUTPATIENT)
Dept: ADMINISTRATIVE | Facility: HOSPITAL | Age: 48
End: 2023-10-18
Payer: MEDICAID

## 2024-12-06 ENCOUNTER — PATIENT OUTREACH (OUTPATIENT)
Dept: ADMINISTRATIVE | Facility: HOSPITAL | Age: 49
End: 2024-12-06
Payer: MEDICAID

## 2025-02-02 NOTE — PROGRESS NOTES
Routine outreach for controlled HTN patient. No answer - LVM.     Last 5 Patient Entered Readings                                      Current 30 Day Average: 108/80     Recent Readings 5/7/2020 4/27/2020 4/19/2020 4/14/2020 4/9/2020    SBP (mmHg) 116 108 96 112 111    DBP (mmHg) 86 77 76 80 84    Pulse 70 77 78 64 72        Readings well controlled. Some readings at risk of hypotension symptoms. Would like to monitor for lightheadedness or dizziness.    [Father] : father [Cow's milk (Ounces per day ___)] : consumes [unfilled] oz of Cow's milk per day [Fruit] : fruit [Vegetables] : vegetables [Meat] : meat [Cereal] : cereal [Eggs] : eggs [Baby food] : baby food [Finger Foods] : finger foods [Table food] : table food [___ stools per day] : [unfilled]  stools per day [Normal] : Normal [In crib] : In crib [Wakes up at night] : Wakes up at night [Pacifier use] : Pacifier use [Sippy cup use] : Sippy cup use [Bottle in bed] : Bottle in bed [None] : Primary Fluoride Source: None [Playtime] : Playtime  [Temper Tantrums] : Temper Tantrums [No] : Not at  exposure [Water heater temperature set at <120 degrees F] : Water heater temperature set at <120 degrees F [Car seat in back seat] : Car seat in back seat [Carbon Monoxide Detectors] : Carbon monoxide detectors [Smoke Detectors] : Smoke detectors [NO] : No [Exposure to electronic nicotine delivery system] : No exposure to electronic nicotine delivery system

## (undated) DEVICE — KIT WING PAD POSITIONING

## (undated) DEVICE — GLOVE BIOGEL SKINSENSE PI 6.5

## (undated) DEVICE — JELLY SURGILUBE 5GR

## (undated) DEVICE — SOL STRL WATER INJ 1000ML BG

## (undated) DEVICE — TUBING INSUFFLATOR 10 LUER

## (undated) DEVICE — SOL NS 1000CC

## (undated) DEVICE — NDL INSUF ULTRA VERESS 120MM

## (undated) DEVICE — SUT 0 VICRYL PLUS CT-1 27IN

## (undated) DEVICE — SPONGE LAP 18X18 PREWASHED

## (undated) DEVICE — TIP RUMI KOH-EFFICIENT 3.5

## (undated) DEVICE — SOL CLEARIFY VISUALIZATION LAP

## (undated) DEVICE — SUCTION IRRIGATOR PROBE

## (undated) DEVICE — SEE MEDLINE ITEM 157110

## (undated) DEVICE — SYR 50CC LL

## (undated) DEVICE — SET CYSTO IRRIGATION UNIV SPIK

## (undated) DEVICE — IRRIGATOR ENDOSCOPY DISP.

## (undated) DEVICE — STRIP STERI REIN CLSR 1/2X2IN

## (undated) DEVICE — TROCAR KII FIOS 5MM X 100MM

## (undated) DEVICE — TIP RUMI GREEN DISPOSABLE6.7MM

## (undated) DEVICE — ELECTRODE REM PLYHSV RETURN 9

## (undated) DEVICE — SEE MEDLINE ITEM 156930

## (undated) DEVICE — SYR 10CC LUER LOCK

## (undated) DEVICE — SUT VLOC 180 ABSORB ESTITCH

## (undated) DEVICE — SOL 9P NACL IRR PIC IL

## (undated) DEVICE — UNDERGLOVES BIOGEL PI SZ 7 LF

## (undated) DEVICE — SEALER LIGASURE LAP 37CM 5MM

## (undated) DEVICE — TROCAR KII FIOS 11MM X 100MM

## (undated) DEVICE — PAD ABD 8X10 STERILE

## (undated) DEVICE — SCISSOR 5MMX35CM DIRECT DRIVE

## (undated) DEVICE — SUT MCRYL PLUS 4-0 PS2 27IN

## (undated) DEVICE — PACK LAPAROSCOPY BAPTIST